# Patient Record
Sex: MALE | Race: WHITE | NOT HISPANIC OR LATINO | Employment: PART TIME | URBAN - METROPOLITAN AREA
[De-identification: names, ages, dates, MRNs, and addresses within clinical notes are randomized per-mention and may not be internally consistent; named-entity substitution may affect disease eponyms.]

---

## 2017-06-13 ENCOUNTER — OFFICE VISIT (OUTPATIENT)
Dept: FAMILY MEDICINE | Facility: CLINIC | Age: 32
End: 2017-06-13
Attending: FAMILY MEDICINE
Payer: COMMERCIAL

## 2017-06-13 ENCOUNTER — LAB VISIT (OUTPATIENT)
Dept: LAB | Facility: HOSPITAL | Age: 32
End: 2017-06-13
Attending: FAMILY MEDICINE
Payer: COMMERCIAL

## 2017-06-13 VITALS
SYSTOLIC BLOOD PRESSURE: 132 MMHG | RESPIRATION RATE: 16 BRPM | HEIGHT: 74 IN | DIASTOLIC BLOOD PRESSURE: 78 MMHG | OXYGEN SATURATION: 99 % | BODY MASS INDEX: 26.68 KG/M2 | HEART RATE: 58 BPM | WEIGHT: 207.88 LBS

## 2017-06-13 DIAGNOSIS — Z00.00 ANNUAL PHYSICAL EXAM: ICD-10-CM

## 2017-06-13 DIAGNOSIS — Z00.00 ANNUAL PHYSICAL EXAM: Primary | ICD-10-CM

## 2017-06-13 LAB
ALBUMIN SERPL BCP-MCNC: 4.1 G/DL
ALP SERPL-CCNC: 78 U/L
ALT SERPL W/O P-5'-P-CCNC: 16 U/L
ANION GAP SERPL CALC-SCNC: 9 MMOL/L
AST SERPL-CCNC: 17 U/L
BASOPHILS # BLD AUTO: 0.01 K/UL
BASOPHILS NFR BLD: 0.2 %
BILIRUB SERPL-MCNC: 0.5 MG/DL
BILIRUB SERPL-MCNC: NEGATIVE MG/DL
BLOOD URINE, POC: NEGATIVE
BUN SERPL-MCNC: 15 MG/DL
CALCIUM SERPL-MCNC: 9.6 MG/DL
CHLORIDE SERPL-SCNC: 102 MMOL/L
CO2 SERPL-SCNC: 25 MMOL/L
COLOR, POC UA: YELLOW
CREAT SERPL-MCNC: 1.1 MG/DL
DIFFERENTIAL METHOD: NORMAL
EOSINOPHIL # BLD AUTO: 0.1 K/UL
EOSINOPHIL NFR BLD: 1.5 %
ERYTHROCYTE [DISTWIDTH] IN BLOOD BY AUTOMATED COUNT: 12.3 %
EST. GFR  (AFRICAN AMERICAN): >60 ML/MIN/1.73 M^2
EST. GFR  (NON AFRICAN AMERICAN): >60 ML/MIN/1.73 M^2
GLUCOSE SERPL-MCNC: 93 MG/DL
GLUCOSE UR QL STRIP: NORMAL
HCT VFR BLD AUTO: 43 %
HGB BLD-MCNC: 14.4 G/DL
KETONES UR QL STRIP: NEGATIVE
LEUKOCYTE ESTERASE URINE, POC: NEGATIVE
LYMPHOCYTES # BLD AUTO: 2.1 K/UL
LYMPHOCYTES NFR BLD: 39.6 %
MCH RBC QN AUTO: 30.3 PG
MCHC RBC AUTO-ENTMCNC: 33.5 %
MCV RBC AUTO: 90 FL
MONOCYTES # BLD AUTO: 0.4 K/UL
MONOCYTES NFR BLD: 8.1 %
NEUTROPHILS # BLD AUTO: 2.7 K/UL
NEUTROPHILS NFR BLD: 50.4 %
NITRITE, POC UA: NEGATIVE
PH, POC UA: 6
PLATELET # BLD AUTO: 245 K/UL
PMV BLD AUTO: 11.5 FL
POTASSIUM SERPL-SCNC: 4.4 MMOL/L
PROT SERPL-MCNC: 7.4 G/DL
PROTEIN, POC: ABNORMAL
RBC # BLD AUTO: 4.76 M/UL
SODIUM SERPL-SCNC: 136 MMOL/L
SPECIFIC GRAVITY, POC UA: 1.01
TSH SERPL DL<=0.005 MIU/L-ACNC: 1.62 UIU/ML
UROBILINOGEN, POC UA: NORMAL
WBC # BLD AUTO: 5.41 K/UL

## 2017-06-13 PROCEDURE — 99999 PR PBB SHADOW E&M-NEW PATIENT-LVL III: CPT | Mod: PBBFAC,,, | Performed by: FAMILY MEDICINE

## 2017-06-13 PROCEDURE — 99385 PREV VISIT NEW AGE 18-39: CPT | Mod: S$GLB,,, | Performed by: FAMILY MEDICINE

## 2017-06-13 PROCEDURE — 85025 COMPLETE CBC W/AUTO DIFF WBC: CPT

## 2017-06-13 PROCEDURE — 81001 URINALYSIS AUTO W/SCOPE: CPT | Mod: S$GLB,,, | Performed by: FAMILY MEDICINE

## 2017-06-13 PROCEDURE — 84443 ASSAY THYROID STIM HORMONE: CPT

## 2017-06-13 PROCEDURE — 80053 COMPREHEN METABOLIC PANEL: CPT

## 2017-06-13 PROCEDURE — 36415 COLL VENOUS BLD VENIPUNCTURE: CPT | Mod: PO

## 2017-06-13 NOTE — PATIENT INSTRUCTIONS
Your test results will be communicated to you via : [facility], Telephone or Letter.   If you have not received your test results in one week, please contact the clinic at [phone].

## 2017-06-15 NOTE — PROGRESS NOTES
Subjective:       Patient ID: Yg Oh is a 31 y.o. male.    Chief Complaint: Annual Exam    HPI   Pt is here for annual exam pt is well no sob/cp no complaints  Review of Systems   Constitutional: Negative for activity change, chills, fatigue and fever.   HENT: Negative for congestion, ear pain, hearing loss, postnasal drip, rhinorrhea, sinus pressure and sore throat.    Eyes: Negative for photophobia, pain, redness and visual disturbance.   Respiratory: Negative for cough, chest tightness and shortness of breath.    Cardiovascular: Negative for chest pain, palpitations and leg swelling.   Gastrointestinal: Negative for abdominal pain, blood in stool, constipation, diarrhea, nausea and vomiting.   Genitourinary: Negative for decreased urine volume, difficulty urinating, discharge, dysuria, frequency and urgency.   Musculoskeletal: Negative for back pain, joint swelling and neck pain.   Skin: Negative for color change, pallor and rash.   Neurological: Negative for dizziness, seizures, speech difficulty and numbness.   Hematological: Does not bruise/bleed easily.   Psychiatric/Behavioral: Negative for behavioral problems, confusion, decreased concentration and suicidal ideas.       Objective:      Physical Exam   Constitutional: He is oriented to person, place, and time. He appears well-developed and well-nourished. No distress.   HENT:   Head: Normocephalic and atraumatic.   Nose: Nose normal.   Mouth/Throat: Oropharynx is clear and moist.   Eyes: EOM are normal. Pupils are equal, round, and reactive to light.   Neck: Normal range of motion. Neck supple. No thyromegaly present.   Cardiovascular: Normal rate, regular rhythm, normal heart sounds and intact distal pulses.  Exam reveals no gallop and no friction rub.    No murmur heard.  Pulmonary/Chest: Effort normal and breath sounds normal. No respiratory distress.   Abdominal: Soft. Bowel sounds are normal. He exhibits no distension. There is no tenderness. There  "is no rebound and no guarding.   Genitourinary:   Genitourinary Comments: declined   Musculoskeletal: Normal range of motion. He exhibits no edema or tenderness.   Neurological: He is alert and oriented to person, place, and time. No cranial nerve deficit. Coordination normal.   Skin: Skin is warm and dry. No erythema.   Psychiatric: He has a normal mood and affect. His behavior is normal. Judgment and thought content normal.       Assessment:       1. Annual physical exam        Plan:     orders cbc cmp lipid tsh   Cont meds  Low fat low salt diet  Graded exercise  rtc annually and prn     Health maintenance  Lipid ordered  Flu shot in fall  Tetanus q 10 years         "This note will not be shared with the patient."   "

## 2017-06-16 ENCOUNTER — TELEPHONE (OUTPATIENT)
Dept: FAMILY MEDICINE | Facility: CLINIC | Age: 32
End: 2017-06-16

## 2018-08-13 ENCOUNTER — LAB VISIT (OUTPATIENT)
Dept: LAB | Facility: HOSPITAL | Age: 33
End: 2018-08-13
Attending: FAMILY MEDICINE
Payer: COMMERCIAL

## 2018-08-13 ENCOUNTER — OFFICE VISIT (OUTPATIENT)
Dept: FAMILY MEDICINE | Facility: CLINIC | Age: 33
End: 2018-08-13
Attending: FAMILY MEDICINE
Payer: COMMERCIAL

## 2018-08-13 VITALS
OXYGEN SATURATION: 98 % | DIASTOLIC BLOOD PRESSURE: 70 MMHG | BODY MASS INDEX: 27.38 KG/M2 | HEIGHT: 74 IN | SYSTOLIC BLOOD PRESSURE: 102 MMHG | WEIGHT: 213.31 LBS | HEART RATE: 63 BPM

## 2018-08-13 DIAGNOSIS — H61.22 CERUMEN DEBRIS ON TYMPANIC MEMBRANE OF LEFT EAR: ICD-10-CM

## 2018-08-13 DIAGNOSIS — Z00.00 ANNUAL PHYSICAL EXAM: ICD-10-CM

## 2018-08-13 DIAGNOSIS — Z00.00 ANNUAL PHYSICAL EXAM: Primary | ICD-10-CM

## 2018-08-13 LAB
ALBUMIN SERPL BCP-MCNC: 4.2 G/DL
ALP SERPL-CCNC: 75 U/L
ALT SERPL W/O P-5'-P-CCNC: 21 U/L
ANION GAP SERPL CALC-SCNC: 7 MMOL/L
AST SERPL-CCNC: 19 U/L
BASOPHILS # BLD AUTO: 0.02 K/UL
BASOPHILS NFR BLD: 0.4 %
BILIRUB SERPL-MCNC: 0.6 MG/DL
BILIRUB SERPL-MCNC: NEGATIVE MG/DL
BLOOD URINE, POC: NEGATIVE
BUN SERPL-MCNC: 14 MG/DL
CALCIUM SERPL-MCNC: 9.7 MG/DL
CHLORIDE SERPL-SCNC: 104 MMOL/L
CHOLEST SERPL-MCNC: 201 MG/DL
CHOLEST/HDLC SERPL: 4 {RATIO}
CO2 SERPL-SCNC: 27 MMOL/L
COLOR, POC UA: YELLOW
CREAT SERPL-MCNC: 1 MG/DL
DIFFERENTIAL METHOD: ABNORMAL
EOSINOPHIL # BLD AUTO: 0.1 K/UL
EOSINOPHIL NFR BLD: 1.8 %
ERYTHROCYTE [DISTWIDTH] IN BLOOD BY AUTOMATED COUNT: 12.7 %
EST. GFR  (AFRICAN AMERICAN): >60 ML/MIN/1.73 M^2
EST. GFR  (NON AFRICAN AMERICAN): >60 ML/MIN/1.73 M^2
GLUCOSE SERPL-MCNC: 95 MG/DL
GLUCOSE UR QL STRIP: NORMAL
HCT VFR BLD AUTO: 47.1 %
HDLC SERPL-MCNC: 50 MG/DL
HDLC SERPL: 24.9 %
HGB BLD-MCNC: 14.8 G/DL
IMM GRANULOCYTES # BLD AUTO: 0.01 K/UL
IMM GRANULOCYTES NFR BLD AUTO: 0.2 %
KETONES UR QL STRIP: NEGATIVE
LDLC SERPL CALC-MCNC: 134.4 MG/DL
LEUKOCYTE ESTERASE URINE, POC: NEGATIVE
LYMPHOCYTES # BLD AUTO: 2.1 K/UL
LYMPHOCYTES NFR BLD: 42.1 %
MCH RBC QN AUTO: 29.2 PG
MCHC RBC AUTO-ENTMCNC: 31.4 G/DL
MCV RBC AUTO: 93 FL
MONOCYTES # BLD AUTO: 0.3 K/UL
MONOCYTES NFR BLD: 6.6 %
NEUTROPHILS # BLD AUTO: 2.4 K/UL
NEUTROPHILS NFR BLD: 48.9 %
NITRITE, POC UA: NEGATIVE
NONHDLC SERPL-MCNC: 151 MG/DL
NRBC BLD-RTO: 0 /100 WBC
PH, POC UA: 6
PLATELET # BLD AUTO: 235 K/UL
PMV BLD AUTO: 11.4 FL
POTASSIUM SERPL-SCNC: 4.4 MMOL/L
PROT SERPL-MCNC: 7.4 G/DL
PROTEIN, POC: NEGATIVE
RBC # BLD AUTO: 5.07 M/UL
SODIUM SERPL-SCNC: 138 MMOL/L
SPECIFIC GRAVITY, POC UA: 1.01
TRIGL SERPL-MCNC: 83 MG/DL
TSH SERPL DL<=0.005 MIU/L-ACNC: 1.41 UIU/ML
UROBILINOGEN, POC UA: NORMAL
WBC # BLD AUTO: 4.87 K/UL

## 2018-08-13 PROCEDURE — 99999 PR PBB SHADOW E&M-EST. PATIENT-LVL III: CPT | Mod: PBBFAC,,, | Performed by: FAMILY MEDICINE

## 2018-08-13 PROCEDURE — 99395 PREV VISIT EST AGE 18-39: CPT | Mod: 25,S$GLB,, | Performed by: FAMILY MEDICINE

## 2018-08-13 PROCEDURE — 80061 LIPID PANEL: CPT

## 2018-08-13 PROCEDURE — 84443 ASSAY THYROID STIM HORMONE: CPT

## 2018-08-13 PROCEDURE — 80053 COMPREHEN METABOLIC PANEL: CPT

## 2018-08-13 PROCEDURE — 85025 COMPLETE CBC W/AUTO DIFF WBC: CPT

## 2018-08-13 PROCEDURE — 81001 URINALYSIS AUTO W/SCOPE: CPT | Mod: S$GLB,,, | Performed by: FAMILY MEDICINE

## 2018-08-13 PROCEDURE — 36415 COLL VENOUS BLD VENIPUNCTURE: CPT | Mod: PO

## 2018-08-13 NOTE — PROGRESS NOTES
Subjective:       Patient ID: Yg Oh is a 32 y.o. male.    Chief Complaint: Annual Exam    HPI   Pt is here for annual exam pt is well no sob/cp no change in bowel habits no brbpr  Review of Systems   Constitutional: Negative for activity change, chills, fatigue and fever.   HENT: Negative for congestion, ear pain, hearing loss, postnasal drip, rhinorrhea, sinus pressure and sore throat.    Eyes: Negative for photophobia, pain, redness and visual disturbance.   Respiratory: Negative for cough, chest tightness and shortness of breath.    Cardiovascular: Negative for chest pain, palpitations and leg swelling.   Gastrointestinal: Negative for abdominal pain, blood in stool, constipation, diarrhea, nausea and vomiting.   Genitourinary: Negative for decreased urine volume, difficulty urinating, discharge, dysuria, frequency and urgency.   Musculoskeletal: Negative for back pain, joint swelling and neck pain.   Skin: Negative for color change, pallor and rash.   Neurological: Negative for dizziness, seizures, speech difficulty and numbness.   Hematological: Does not bruise/bleed easily.   Psychiatric/Behavioral: Negative for behavioral problems, confusion, decreased concentration and suicidal ideas.       Objective:      Physical Exam   Constitutional: He is oriented to person, place, and time. He appears well-developed and well-nourished. No distress.   HENT:   Head: Normocephalic and atraumatic.   Nose: Nose normal.   Mouth/Throat: Oropharynx is clear and moist.   Tm pearly right obscured left   Eyes: EOM are normal. Pupils are equal, round, and reactive to light.   Neck: Normal range of motion. Neck supple. No thyromegaly present.   Cardiovascular: Normal rate, regular rhythm and intact distal pulses. Exam reveals no gallop and no friction rub.   Pulmonary/Chest: Effort normal and breath sounds normal. No respiratory distress.   Abdominal: Soft. Bowel sounds are normal. He exhibits no distension. There is no  "tenderness. There is no rebound and no guarding.   Genitourinary:   Genitourinary Comments: declined   Musculoskeletal: Normal range of motion. He exhibits no edema or tenderness.   Neurological: He is alert and oriented to person, place, and time. No cranial nerve deficit. Coordination normal.   Skin: Skin is warm and dry. No erythema.   Psychiatric: He has a normal mood and affect. His behavior is normal. Judgment and thought content normal.       Assessment:       1. Annual physical exam    2. Cerumen debris on tympanic membrane of left ear        Plan:     orders cmp cbc lipid tsh urine  Cont meds   low fat diet  Graded exercise  rtc annually     Health maintenance  Lipid ordered  Flu in fall  Tetanus q 10 years      "This note will not be shared with the patient."   "

## 2018-08-14 ENCOUNTER — TELEPHONE (OUTPATIENT)
Dept: FAMILY MEDICINE | Facility: CLINIC | Age: 33
End: 2018-08-14

## 2019-02-12 ENCOUNTER — CLINICAL SUPPORT (OUTPATIENT)
Dept: AUDIOLOGY | Facility: CLINIC | Age: 34
End: 2019-02-12
Payer: COMMERCIAL

## 2019-02-12 ENCOUNTER — OFFICE VISIT (OUTPATIENT)
Dept: OTOLARYNGOLOGY | Facility: CLINIC | Age: 34
End: 2019-02-12
Payer: COMMERCIAL

## 2019-02-12 VITALS
DIASTOLIC BLOOD PRESSURE: 67 MMHG | BODY MASS INDEX: 28.5 KG/M2 | WEIGHT: 222 LBS | TEMPERATURE: 97 F | HEART RATE: 69 BPM | SYSTOLIC BLOOD PRESSURE: 103 MMHG

## 2019-02-12 DIAGNOSIS — H61.23 HEARING LOSS DUE TO CERUMEN IMPACTION, BILATERAL: Primary | ICD-10-CM

## 2019-02-12 DIAGNOSIS — Z86.69 HISTORY OF EAR INFECTION: ICD-10-CM

## 2019-02-12 DIAGNOSIS — H92.02 EAR DISCOMFORT, LEFT: Primary | ICD-10-CM

## 2019-02-12 DIAGNOSIS — H61.22 HEARING LOSS DUE TO CERUMEN IMPACTION, LEFT: ICD-10-CM

## 2019-02-12 PROCEDURE — 99999 PR PBB SHADOW E&M-EST. PATIENT-LVL I: CPT | Mod: PBBFAC,,,

## 2019-02-12 PROCEDURE — 92567 PR TYMPA2METRY: ICD-10-PCS | Mod: S$GLB,,, | Performed by: AUDIOLOGIST-HEARING AID FITTER

## 2019-02-12 PROCEDURE — 99999 PR PBB SHADOW E&M-EST. PATIENT-LVL III: ICD-10-PCS | Mod: PBBFAC,,, | Performed by: OTOLARYNGOLOGY

## 2019-02-12 PROCEDURE — 99203 OFFICE O/P NEW LOW 30 MIN: CPT | Mod: 25,S$GLB,, | Performed by: OTOLARYNGOLOGY

## 2019-02-12 PROCEDURE — 69210 REMOVE IMPACTED EAR WAX UNI: CPT | Mod: S$GLB,,, | Performed by: OTOLARYNGOLOGY

## 2019-02-12 PROCEDURE — 92557 COMPREHENSIVE HEARING TEST: CPT | Mod: S$GLB,,, | Performed by: AUDIOLOGIST-HEARING AID FITTER

## 2019-02-12 PROCEDURE — 99999 PR PBB SHADOW E&M-EST. PATIENT-LVL III: CPT | Mod: PBBFAC,,, | Performed by: OTOLARYNGOLOGY

## 2019-02-12 PROCEDURE — 3008F BODY MASS INDEX DOCD: CPT | Mod: CPTII,S$GLB,, | Performed by: OTOLARYNGOLOGY

## 2019-02-12 PROCEDURE — 99203 PR OFFICE/OUTPT VISIT, NEW, LEVL III, 30-44 MIN: ICD-10-PCS | Mod: 25,S$GLB,, | Performed by: OTOLARYNGOLOGY

## 2019-02-12 PROCEDURE — 99999 PR PBB SHADOW E&M-EST. PATIENT-LVL I: ICD-10-PCS | Mod: PBBFAC,,,

## 2019-02-12 PROCEDURE — 92567 TYMPANOMETRY: CPT | Mod: S$GLB,,, | Performed by: AUDIOLOGIST-HEARING AID FITTER

## 2019-02-12 PROCEDURE — 69210 PR REMOVAL IMPACTED CERUMEN REQUIRING INSTRUMENTATION, UNILATERAL: ICD-10-PCS | Mod: S$GLB,,, | Performed by: OTOLARYNGOLOGY

## 2019-02-12 PROCEDURE — 92557 PR COMPREHENSIVE HEARING TEST: ICD-10-PCS | Mod: S$GLB,,, | Performed by: AUDIOLOGIST-HEARING AID FITTER

## 2019-02-12 PROCEDURE — 3008F PR BODY MASS INDEX (BMI) DOCUMENTED: ICD-10-PCS | Mod: CPTII,S$GLB,, | Performed by: OTOLARYNGOLOGY

## 2019-02-12 NOTE — PATIENT INSTRUCTIONS
Large occlusive C.I. extracted from AS eac  Audiometry reviewed: hearing WNL  Hearing protection encouraged prn  RTC prn ear cleaning

## 2019-02-12 NOTE — PROGRESS NOTES
Subjective:       Patient ID: Yg Oh is a 33 y.o. male.    Chief Complaint: Cerumen Impaction    HPI; Mr. Oh is a 33-year-old gentleman originally from near [facility] who works for the [employer].  He works as a  and .   He indicates trouble hearing and ear ringing perception in his left ear. His hand written reason for the visit today is ear flush  He indicates the feeling of something blocking his left ear.  He denies any history of ear pain, ear pressure, ear discharge, dizziness, exposure to ototoxic drugs or a family history of hearing.  He indicates trouble with the ear infection treated with antibiotics at an [facility] after his plane landed a few years ago.    PMH:  PSH:  Family history:  Mental illness, breast cancer  Allergies:  None known  Habits:  2 alcoholic drinks per day and 1 cup of coffee per day  Occupation:    Review of Systems   Ears: Positive for hearing loss and ringing in ear.    Nose:  Positive for nosebleeds, nasal obstruction and postnasal drip.    Mouth/Throat: Positive for oral ulcers.   Gastrointestinal:  Positive for acid reflux and blood in stool.   Other:  Negative for rash.         The patient completed an audiometric study performed by the [facility] audiology service after his left ear was cleaned.  The study is duplicated below and the results are reviewed with him in detail  Objective:         Blood pressure 103/67 pulse 69 temperature 97.2° height 6 ft 2 in weight 222 lb  General:  Alert and oriented, bearded, mustachioed Aussie gentleman in no acute distress  Both ears were examined under the microscope in the micro procedure room  Procedure:  A large occlusive cerumen impaction is carefully extracted from left ear canal with an angle pick  Physical Exam   Constitutional: He is oriented to person, place, and time. He appears well-developed and well-nourished.   HENT:   Head: Normocephalic.   Right Ear: Hearing, tympanic  membrane and ear canal normal. No drainage. No foreign bodies. No mastoid tenderness. Tympanic membrane is not perforated. No decreased hearing is noted.   Left Ear: Hearing, tympanic membrane and ear canal normal. No drainage. No foreign bodies. No mastoid tenderness. Tympanic membrane is not perforated. No decreased hearing is noted.   Ears:    Nose: No nose lacerations, nasal deformity, septal deviation or nasal septal hematoma. No epistaxis. Right sinus exhibits no maxillary sinus tenderness and no frontal sinus tenderness. Left sinus exhibits no maxillary sinus tenderness and no frontal sinus tenderness.   Mouth/Throat: Uvula is midline, oropharynx is clear and moist and mucous membranes are normal. He does not have dentures. No oral lesions. No trismus in the jaw. No uvula swelling or dental caries. No oropharyngeal exudate or tonsillar abscesses.   Neck: No thyromegaly present.   Pulmonary/Chest: Effort normal. No stridor.   Lymphadenopathy:     He has no cervical adenopathy.   Neurological: He is alert and oriented to person, place, and time.   Skin: No rash noted.   Psychiatric: His behavior is normal.       Assessment:       1. Ear discomfort, left    2. Hearing loss due to cerumen impaction, left    3. History of ear infection        Plan:     Large occlusive C.I. extracted from AS eac  Audiometry reviewed: hearing WNL  Hearing protection encouraged prn  RTC prn ear cleaning

## 2019-02-12 NOTE — LETTER
February 13, 2019      Shanna Rodrigues MD  [address]  [address]  [facility] [address]           [facility] - Otorhinolaryngology  [address]  [facility] [address]  Phone: [phone]  Fax: [phone]          Patient: Yg Oh   MR Number: [id number]   YOB: 1985   Date of Visit: 2/12/2019       Dear Dr. Shanna Rodrigues:    Thank you for referring Yg Oh to me for evaluation. Attached you will find relevant portions of my assessment and plan of care.    If you have questions, please do not hesitate to call me. I look forward to following Yg Oh along with you.    Sincerely,    Matt Sims III, MD    Enclosure  CC:  No Recipients    If you would like to receive this communication electronically, please contact [email or web address] or [phone] to request more information on [facility] access.    For providers and/or their staff who would like to refer a patient to [facility], please contact us through our one-stop-shop provider referral line, [facility], at [phone].    If you feel you have received this communication in error or would no longer like to receive these types of communications, please e-mail [email or web address]

## 2019-02-12 NOTE — PROGRESS NOTES
Yg Oh was seen in the clinic today for a hearing evaluation following cerumen removal by Dr. Sims. Mr. Oh reported occasional noise exposure.       Audiological testing revealed hearing within normal limits in the left ear and normal to borderline normal hearing in the right ear. A speech reception threshold was obtained at 5 dBHL, bilaterally. Speech recognition was 100% in both ears.    Tympanometry revealed normal Type A tympanograms, bilaterally.    Recommendations:  1. Otologic evaluation  2. Annual hearing evaluation

## 2019-04-30 ENCOUNTER — OFFICE VISIT (OUTPATIENT)
Dept: URGENT CARE | Facility: CLINIC | Age: 34
End: 2019-04-30
Payer: COMMERCIAL

## 2019-04-30 VITALS
WEIGHT: 222 LBS | TEMPERATURE: 98 F | BODY MASS INDEX: 28.49 KG/M2 | RESPIRATION RATE: 16 BRPM | OXYGEN SATURATION: 99 % | HEIGHT: 74 IN | HEART RATE: 72 BPM | SYSTOLIC BLOOD PRESSURE: 122 MMHG | DIASTOLIC BLOOD PRESSURE: 72 MMHG

## 2019-04-30 DIAGNOSIS — M26.609 TMJ (TEMPOROMANDIBULAR JOINT SYNDROME): ICD-10-CM

## 2019-04-30 DIAGNOSIS — J01.00 ACUTE MAXILLARY SINUSITIS, RECURRENCE NOT SPECIFIED: Primary | ICD-10-CM

## 2019-04-30 PROCEDURE — 99213 OFFICE O/P EST LOW 20 MIN: CPT | Mod: S$GLB,,, | Performed by: NURSE PRACTITIONER

## 2019-04-30 PROCEDURE — 99213 PR OFFICE/OUTPT VISIT, EST, LEVL III, 20-29 MIN: ICD-10-PCS | Mod: S$GLB,,, | Performed by: NURSE PRACTITIONER

## 2019-04-30 PROCEDURE — 3008F BODY MASS INDEX DOCD: CPT | Mod: CPTII,S$GLB,, | Performed by: NURSE PRACTITIONER

## 2019-04-30 PROCEDURE — 3008F PR BODY MASS INDEX (BMI) DOCUMENTED: ICD-10-PCS | Mod: CPTII,S$GLB,, | Performed by: NURSE PRACTITIONER

## 2019-04-30 RX ORDER — IBUPROFEN 800 MG/1
800 TABLET ORAL 3 TIMES DAILY
Qty: 30 TABLET | Refills: 0 | Status: SHIPPED | OUTPATIENT
Start: 2019-04-30

## 2019-04-30 RX ORDER — FLUTICASONE PROPIONATE 50 MCG
2 SPRAY, SUSPENSION (ML) NASAL DAILY
Qty: 1 BOTTLE | Refills: 0 | Status: SHIPPED | OUTPATIENT
Start: 2019-04-30 | End: 2019-07-27 | Stop reason: ALTCHOICE

## 2019-04-30 RX ORDER — PREDNISONE 20 MG/1
20 TABLET ORAL DAILY
Qty: 4 TABLET | Refills: 0 | Status: SHIPPED | OUTPATIENT
Start: 2019-04-30 | End: 2019-05-04

## 2019-04-30 NOTE — PROGRESS NOTES
"Subjective:       Patient ID: Yg Oh is a 33 y.o. male.    Vitals:    04/30/19 1256   BP: 122/72   Pulse: 72   Resp: 16   Temp: 98.1 °F (36.7 °C)   SpO2: 99%   Weight: 100.7 kg (222 lb)   Height: 6' 2" (1.88 m)       Chief Complaint: Sinus Problem    Pt states sinus congestion and pressure off and on x 1 month. C/o left ear and jaw pain. Pain worsening with eating.     Sinus Problem   This is a recurrent problem. The current episode started more than 1 month ago. The problem is unchanged. There has been no fever. Associated symptoms include congestion, ear pain, sinus pressure and sneezing. Pertinent negatives include no chills, headaches, shortness of breath or sore throat. Treatments tried: sudafed, ibuprofen. The treatment provided mild relief.     Review of Systems   Constitution: Negative for chills and fever.   HENT: Positive for congestion, ear pain, sinus pressure and sneezing. Negative for sore throat.    Eyes: Negative for blurred vision.   Cardiovascular: Negative for chest pain.   Respiratory: Negative for shortness of breath.    Skin: Negative for rash.   Musculoskeletal: Negative for back pain and joint pain.   Gastrointestinal: Negative for abdominal pain, diarrhea, nausea and vomiting.   Neurological: Negative for headaches.   Psychiatric/Behavioral: The patient is not nervous/anxious.        Objective:      Physical Exam   Constitutional: He is oriented to person, place, and time. He appears well-developed and well-nourished. He is cooperative.  Non-toxic appearance. He does not appear ill. No distress.   HENT:   Head: Normocephalic and atraumatic.       Right Ear: Hearing, tympanic membrane, external ear and ear canal normal.   Left Ear: Hearing, tympanic membrane, external ear and ear canal normal.   Nose: Nose normal. No mucosal edema, rhinorrhea or nasal deformity. No epistaxis. Right sinus exhibits no maxillary sinus tenderness and no frontal sinus tenderness. Left sinus exhibits no " maxillary sinus tenderness and no frontal sinus tenderness.   Mouth/Throat: Uvula is midline, oropharynx is clear and moist and mucous membranes are normal. No trismus in the jaw. Normal dentition. No uvula swelling. No posterior oropharyngeal erythema.   Eyes: Conjunctivae and lids are normal. Right eye exhibits no discharge. Left eye exhibits no discharge. No scleral icterus.   Sclera clear bilat   Neck: Trachea normal, normal range of motion, full passive range of motion without pain and phonation normal. Neck supple.   Cardiovascular: Normal rate, regular rhythm, normal heart sounds, intact distal pulses and normal pulses.   Pulmonary/Chest: Effort normal and breath sounds normal. No respiratory distress.   Abdominal: Soft. Normal appearance and bowel sounds are normal. He exhibits no distension, no pulsatile midline mass and no mass. There is no tenderness.   Musculoskeletal: Normal range of motion. He exhibits no edema or deformity.   Neurological: He is alert and oriented to person, place, and time. He exhibits normal muscle tone. Coordination normal.   Skin: Skin is warm, dry and intact. He is not diaphoretic. No pallor.   Psychiatric: He has a normal mood and affect. His speech is normal and behavior is normal. Judgment and thought content normal. Cognition and memory are normal.   Nursing note and vitals reviewed.      Assessment:       1. Acute maxillary sinusitis, recurrence not specified    2. TMJ (temporomandibular joint syndrome)        Plan:       Yg was seen today for sinus problem.    Diagnoses and all orders for this visit:    Acute maxillary sinusitis, recurrence not specified  -     fluticasone propionate (FLONASE) 50 mcg/actuation nasal spray; 2 sprays (100 mcg total) by Each Nare route once daily.  -     predniSONE (DELTASONE) 20 MG tablet; Take 1 tablet (20 mg total) by mouth once daily. for 4 days    TMJ (temporomandibular joint syndrome)  -     ibuprofen (ADVIL,MOTRIN) 800 MG tablet; Take  1 tablet (800 mg total) by mouth 3 (three) times daily.  -     predniSONE (DELTASONE) 20 MG tablet; Take 1 tablet (20 mg total) by mouth once daily. for 4 days    Please follow up with your Primary care provider within 2-5 days if your signs and symptoms have not resolved or worsen.     If your condition worsens or fails to improve we recommend that you receive another evaluation at the emergency room immediately or contact your primary medical clinic to discuss your concerns.   You must understand that you have received an Urgent Care treatment only and that you may be released before all of your medical problems are known or treated. You, the patient, will arrange for follow up care as instructed.     RED FLAGS/WARNING SYMPTOMS DISCUSSED WITH PATIENT THAT WOULD WARRANT EMERGENT MEDICAL ATTENTION. PATIENT VERBALIZED UNDERSTANDING.       TMJ Syndrome  The temporomandibular joint (TMJ) is the joint that connects your lower jaw to your head. You can feel it in front of your ears when you open and close your mouth. TMJ disorders involve chronic or recurrent pain in the joint. When treated, symptoms of TMJ disorders usually go away within a few months.  Causes  There is no widely agreed-on cause of TMJ disorders. They have been linked to injury, arthritis, chronic fatigue syndrome, and fibromyalgia. A definite connection has not been shown, though.  Symptoms  · Pain in the face, jaw, or neck  · Pain with jaw movement or chewing  · Locking or catching sensation of the jaw  · Clicking, popping, or grinding sounds with movement of the TMJ  · Headache  · Ear pain  Home care  Modest, nonsurgical treatments are a good first step toward relieving symptoms. Try the approaches described below.  · Rest the jaw by avoiding crunchy or hard-to-chew foods. Do not eat hard or sticky candies. Soft foods and liquids are easier on the jaw.  · Protect your jaw while yawning. If you need to yawn, put your fist under your chin to prevent  your mouth from opening up too wide.  · To help relieve pain, try applying hot or cold packs to the painful area. Try both hot and cold to find out which works best for you. If you use hot packs (small towels soaked in hot water), be careful not to burn yourself.  · You may take acetaminophen or ibuprofen for pain, unless you were given a different pain medicine. (Note: If you have chronic liver or kidney disease or have ever had a stomach ulcer or gastrointestinal bleeding, talk with your healthcare provider before using these medicines. Also talk to your provider if you are taking medicine to prevent blood clots.) Aspirin should never be given to anyone younger than 18 years of age who is ill with a viral infection or fever. It may cause severe liver or brain damage.  Reducing stress  If stress seems to be contributing to your symptoms, try to identify the sources of stress in your life. These arent always obvious. Common stressors include:  · Everyday hassles (which can add up), such as traffic jams, missed appointments, or car trouble  · Major life changes, both good (such as a new baby or job promotion) and bad (loss of job or loss of a loved one)  · Overload: The feeling that you have too many responsibilities and can't take care of everything at once  · Helplessness: Feeling like your problems are more than you can solve  When possible, do something about your sources of stress. See if you can avoid hassles, limit the amount of change in your life at one time, and take breaks when you feel overloaded.  Unfortunately, many stressful situations cannot be avoided. So learning how to manage stress better is very important. Getting regular exercise, eating nutritious, balanced meals, and getting adequate rest all help to make everyday stress more manageable. Certain techniques are also helpful: relaxation and breathing exercises, visualization, biofeedback, meditation, or simply taking some time out to clear your  mind. For more information, talk with your healthcare provider.  Follow-up care  Follow up with your healthcare provider, or as advised. Further testing and additional treatment may be required. If changes to your lifestyle do not improve your symptoms, talk with your healthcare provider about other available therapies. These include bite guards for help with teeth grinding, stress management techniques, and more. If stress is an important factor and does not respond to the above simple measures, talk to your doctor about a referral for stress management.  · If X-rays were done, they will be reviewed by a specialist. You will be notified of the results, especially if they affect treatment.  Call 911  Call emergency services right away if any of these occur:  · Trouble breathing or swallowing, wheezing  · Confusion  · Extreme drowsiness or trouble awakening  · Fainting or loss of consciousness  · Rapid heart rate  When to seek medical advice  Call your healthcare provider right away if any of these occur:  · Your face becomes swollen or red.  · Your pain worsens.  · You have increasing neck, mouth, tooth, or throat pain.  · You develop a fever of 100.4F (38°C) or higher  Date Last Reviewed: 7/30/2015  © 7926-8648 [employer]. [address] [place] [address], [place] [address]. All rights reserved. This information is not intended as a substitute for professional medical care. Always follow your healthcare professional's instructions.

## 2019-04-30 NOTE — PATIENT INSTRUCTIONS
Please follow up with your Primary care provider within 2-5 days if your signs and symptoms have not resolved or worsen.     If your condition worsens or fails to improve we recommend that you receive another evaluation at the emergency room immediately or contact your primary medical clinic to discuss your concerns.   You must understand that you have received an Urgent Care treatment only and that you may be released before all of your medical problems are known or treated. You, the patient, will arrange for follow up care as instructed.     RED FLAGS/WARNING SYMPTOMS DISCUSSED WITH PATIENT THAT WOULD WARRANT EMERGENT MEDICAL ATTENTION. PATIENT VERBALIZED UNDERSTANDING.       TMJ Syndrome  The temporomandibular joint (TMJ) is the joint that connects your lower jaw to your head. You can feel it in front of your ears when you open and close your mouth. TMJ disorders involve chronic or recurrent pain in the joint. When treated, symptoms of TMJ disorders usually go away within a few months.  Causes  There is no widely agreed-on cause of TMJ disorders. They have been linked to injury, arthritis, chronic fatigue syndrome, and fibromyalgia. A definite connection has not been shown, though.  Symptoms  · Pain in the face, jaw, or neck  · Pain with jaw movement or chewing  · Locking or catching sensation of the jaw  · Clicking, popping, or grinding sounds with movement of the TMJ  · Headache  · Ear pain  Home care  Modest, nonsurgical treatments are a good first step toward relieving symptoms. Try the approaches described below.  · Rest the jaw by avoiding crunchy or hard-to-chew foods. Do not eat hard or sticky candies. Soft foods and liquids are easier on the jaw.  · Protect your jaw while yawning. If you need to yawn, put your fist under your chin to prevent your mouth from opening up too wide.  · To help relieve pain, try applying hot or cold packs to the painful area. Try both hot and cold to find out which works best  for you. If you use hot packs (small towels soaked in hot water), be careful not to burn yourself.  · You may take acetaminophen or ibuprofen for pain, unless you were given a different pain medicine. (Note: If you have chronic liver or kidney disease or have ever had a stomach ulcer or gastrointestinal bleeding, talk with your healthcare provider before using these medicines. Also talk to your provider if you are taking medicine to prevent blood clots.) Aspirin should never be given to anyone younger than 18 years of age who is ill with a viral infection or fever. It may cause severe liver or brain damage.  Reducing stress  If stress seems to be contributing to your symptoms, try to identify the sources of stress in your life. These arent always obvious. Common stressors include:  · Everyday hassles (which can add up), such as traffic jams, missed appointments, or car trouble  · Major life changes, both good (such as a new baby or job promotion) and bad (loss of job or loss of a loved one)  · Overload: The feeling that you have too many responsibilities and can't take care of everything at once  · Helplessness: Feeling like your problems are more than you can solve  When possible, do something about your sources of stress. See if you can avoid hassles, limit the amount of change in your life at one time, and take breaks when you feel overloaded.  Unfortunately, many stressful situations cannot be avoided. So learning how to manage stress better is very important. Getting regular exercise, eating nutritious, balanced meals, and getting adequate rest all help to make everyday stress more manageable. Certain techniques are also helpful: relaxation and breathing exercises, visualization, biofeedback, meditation, or simply taking some time out to clear your mind. For more information, talk with your healthcare provider.  Follow-up care  Follow up with your healthcare provider, or as advised. Further testing and  additional treatment may be required. If changes to your lifestyle do not improve your symptoms, talk with your healthcare provider about other available therapies. These include bite guards for help with teeth grinding, stress management techniques, and more. If stress is an important factor and does not respond to the above simple measures, talk to your doctor about a referral for stress management.  · If X-rays were done, they will be reviewed by a specialist. You will be notified of the results, especially if they affect treatment.  Call 911  Call emergency services right away if any of these occur:  · Trouble breathing or swallowing, wheezing  · Confusion  · Extreme drowsiness or trouble awakening  · Fainting or loss of consciousness  · Rapid heart rate  When to seek medical advice  Call your healthcare provider right away if any of these occur:  · Your face becomes swollen or red.  · Your pain worsens.  · You have increasing neck, mouth, tooth, or throat pain.  · You develop a fever of 100.4F (38°C) or higher  Date Last Reviewed: 7/30/2015  © 4875-5750 [employer]. [address] [place] [address], [place] [address]. All rights reserved. This information is not intended as a substitute for professional medical care. Always follow your healthcare professional's instructions.

## 2019-06-12 ENCOUNTER — OFFICE VISIT (OUTPATIENT)
Dept: URGENT CARE | Facility: CLINIC | Age: 34
End: 2019-06-12
Payer: COMMERCIAL

## 2019-06-12 VITALS
DIASTOLIC BLOOD PRESSURE: 83 MMHG | BODY MASS INDEX: 28.49 KG/M2 | HEIGHT: 74 IN | WEIGHT: 222 LBS | SYSTOLIC BLOOD PRESSURE: 122 MMHG | OXYGEN SATURATION: 99 % | TEMPERATURE: 98 F | HEART RATE: 69 BPM | RESPIRATION RATE: 18 BRPM

## 2019-06-12 DIAGNOSIS — T63.481A INSECT STINGS, ACCIDENTAL OR UNINTENTIONAL, INITIAL ENCOUNTER: Primary | ICD-10-CM

## 2019-06-12 PROCEDURE — 3008F PR BODY MASS INDEX (BMI) DOCUMENTED: ICD-10-PCS | Mod: CPTII,S$GLB,, | Performed by: EMERGENCY MEDICINE

## 2019-06-12 PROCEDURE — 96372 PR INJECTION,THERAP/PROPH/DIAG2ST, IM OR SUBCUT: ICD-10-PCS | Mod: S$GLB,,, | Performed by: EMERGENCY MEDICINE

## 2019-06-12 PROCEDURE — 96372 THER/PROPH/DIAG INJ SC/IM: CPT | Mod: S$GLB,,, | Performed by: EMERGENCY MEDICINE

## 2019-06-12 PROCEDURE — 99214 PR OFFICE/OUTPT VISIT, EST, LEVL IV, 30-39 MIN: ICD-10-PCS | Mod: 25,S$GLB,, | Performed by: EMERGENCY MEDICINE

## 2019-06-12 PROCEDURE — 3008F BODY MASS INDEX DOCD: CPT | Mod: CPTII,S$GLB,, | Performed by: EMERGENCY MEDICINE

## 2019-06-12 PROCEDURE — 99214 OFFICE O/P EST MOD 30 MIN: CPT | Mod: 25,S$GLB,, | Performed by: EMERGENCY MEDICINE

## 2019-06-12 RX ORDER — TRIAMCINOLONE ACETONIDE 5 MG/G
CREAM TOPICAL 3 TIMES DAILY
Qty: 30 G | Refills: 1 | Status: SHIPPED | OUTPATIENT
Start: 2019-06-12 | End: 2019-07-27 | Stop reason: ALTCHOICE

## 2019-06-12 RX ORDER — BETAMETHASONE SODIUM PHOSPHATE AND BETAMETHASONE ACETATE 3; 3 MG/ML; MG/ML
9 INJECTION, SUSPENSION INTRA-ARTICULAR; INTRALESIONAL; INTRAMUSCULAR; SOFT TISSUE
Status: COMPLETED | OUTPATIENT
Start: 2019-06-12 | End: 2019-06-12

## 2019-06-12 RX ADMIN — BETAMETHASONE SODIUM PHOSPHATE AND BETAMETHASONE ACETATE 9 MG: 3; 3 INJECTION, SUSPENSION INTRA-ARTICULAR; INTRALESIONAL; INTRAMUSCULAR; SOFT TISSUE at 07:06

## 2019-06-13 NOTE — PROGRESS NOTES
"Subjective:       Patient ID: Yg Oh is a 33 y.o. male.    Vitals:    06/12/19 1913   BP: 122/83   Pulse: 69   Resp: 18   Temp: 98.4 °F (36.9 °C)   SpO2: 99%   Weight: 100.7 kg (222 lb)   Height: 6' 2" (1.88 m)       Chief Complaint: Insect Bite (LT HAND YESTERDAY )    Insect Bite   This is a new (BEE STING YESTERDAY ) problem. The current episode started yesterday. The problem has been gradually worsening. Pertinent negatives include no chills, fever, rash or sore throat. Exacerbated by: TOUCHING. Treatments tried: OTC MEDS  The treatment provided no relief.     Review of Systems   Constitution: Negative for chills and fever.   HENT: Negative for sore throat.    Respiratory: Negative for shortness of breath.    Skin: Negative for itching and rash.   Musculoskeletal: Negative for joint pain.       Objective:      Physical Exam   Constitutional: He is oriented to person, place, and time. He appears well-developed and well-nourished.   HENT:   Head: Normocephalic and atraumatic. Head is without abrasion, without contusion and without laceration.   Right Ear: External ear normal.   Left Ear: External ear normal.   Nose: Nose normal.   Mouth/Throat: Oropharynx is clear and moist.   Eyes: Pupils are equal, round, and reactive to light. Conjunctivae, EOM and lids are normal.   Neck: Trachea normal, full passive range of motion without pain and phonation normal. Neck supple.   Cardiovascular: Normal rate, regular rhythm and normal heart sounds.   Pulmonary/Chest: Effort normal and breath sounds normal. No stridor. No respiratory distress.   Musculoskeletal:        Right hand: He exhibits decreased range of motion and swelling. He exhibits no tenderness, no bony tenderness, normal two-point discrimination, normal capillary refill, no deformity and no laceration.        Hands:  Patient has an insect sting to the volar aspect of his left thumb over the proximal phalanx, there is surrounding edema with faint erythema.  " The area is nontender there is no abscess formation he is neurovascularly intact is appearance is consistent with a local reaction to insect sting.   Neurological: He is alert and oriented to person, place, and time.   Skin: Skin is warm, dry and intact. Capillary refill takes less than 2 seconds. No abrasion, no bruising, no burn, no ecchymosis, no laceration, no lesion and no rash noted. No erythema.   Psychiatric: He has a normal mood and affect. His speech is normal and behavior is normal. Judgment and thought content normal. Cognition and memory are normal.   Nursing note and vitals reviewed.      Assessment:       1. Insect stings, accidental or unintentional, initial encounter        Plan:       Yg was seen today for insect bite.    Diagnoses and all orders for this visit:    Insect stings, accidental or unintentional, initial encounter  -     INELASTIC VELCRO WRAP    Other orders  -     betamethasone acetate-betamethasone sodium phosphate injection 9 mg  -     triamcinolone acetonide 0.5% (KENALOG) 0.5 % Crea; Apply topically 3 (three) times daily. for 7 days          Patient Instructions     Return to Clinic for worsening symptoms or signs of infection    Benadryl OTC as directed for 7 days    Claritin, Zyrtec, or Allegra OTC as directed for 7 days      Insect Sting: Local Reaction   You have been stung or bitten by an insect. The insects venom or body fluid is causing your skin to react in the area where you were stung or bitten. This often causes redness, itching and swelling. This reaction will fade over a few hours, but it can last a few days. An insect bite or sting can become infected 1 to 3 days later, so watch for the signs below. Sometimes it is hard to tell the difference between a local reaction to the insect bite or sting and an early infection, so you may be given antibiotics.  Common insect stings causing problems are from wasps, bees, yellow jackets, and hornets. Common bites are from  spiders, mosquitoes, fleas, or ticks. Other types of insects may be more common in different parts of the country or world.  Most people think of allergic reactions when someone has a rash or itchy skin. Symptoms can include:  · Rash, hives, redness, welts, or blisters  · Itching, burning, stinging, or pain  · Swelling around the sting area.  Sometimes swelling spreads to other areas.  Home care  Medicines  The healthcare provider may prescribe medicines to relieve swelling, itching, and pain. Follow the providers instructions when taking these medicines.  · If you had a severe reaction, the provider may prescribe an epinephrine kit. Epinephrine will stop an allergic reaction from getting worse. Before you leave the hospital, be sure that you understand when and how to use this medicine.  · Diphenhydramine is an oral antihistamine available at drugstores and groceries. Unless a prescription antihistamine was given, you can use this medicine to reduce itching if large areas of the skin are involved. The medicine may make you sleepy, so be careful using it in the daytime or when going to school, working, or driving. Dont use diphenhydramine if you have glaucoma or if you are a man with trouble urinating because of an enlarged prostate. Other antihistamines cause less drowsiness and are good choices for daytime use. Ask your pharmacist for suggestions.  · Dont use diphenhydramine cream on your skin. In some people it can cause additional reaction and make you allergic to this medicine.  · Calamine lotion or oatmeal baths sometimes help with itching.  · You may use acetaminophen or ibuprofen to control pain, unless another pain medicine was prescribed. Talk with your healthcare provider before using these medicines if you have chronic liver or kidney disease. Also talk with your provider if youve had a stomach ulcer or GI bleeding.  General care    · If itching is a problem, dont take hot showers or baths. Stay out  of direct sunlight. These heat up your skin and will make the itching worse.  · Use an ice pack to reduce local areas of redness and itching. You can make your own ice pack by putting ice cubes in a bag that seals and wrapping it in a thin towel. Dont put the ice directly on your skin, because it can damage the skin.  · Try not to scratch any affected areas and damage the skin. This will help prevent an infection.  · If oral antibiotics were prescribed, be sure to take them until finished.  Preventing future reactions  · Future reactions could be worse than this one, so try to stay away from places where you might be stung again.  · Be aware that honeybees nest in trees. Wasps and yellow jackets nest in the ground, trees, or roof eaves.  · If you are stung by a honeybee, a stinger will remain in your skin. Wasps, yellow jackets, and hornets dont leave a stinger behind. Move away from the nest area right away. The stinger of a honeybee releases a substance that will attract other bees to you. Once you are away from the nest, then remove the stinger as quickly as possible.  · After any sting, you may apply ice and take diphenhydramine or another antihistamine. If you develop any of the warning signs below, seek help right away.  · If you are at high risk for another sting, or if your reaction included dizziness, fainting, or trouble breathing or swallowing, ask your doctor for an insect allergy kit.  · Remove any ticks on the skin with a set of fine tweezers.  the tick as close to the skin as possible. Pull back gently but firmly. Use an even, steady pressure. Dont jerk or twist. Dont squeeze, crush, or puncture the body of the tick. The bodily fluids may contain infection-causing germs. Dont use a smoldering match or cigarette, nail polish, petroleum jelly, liquid soap, or kerosene. These may irritate the tick. If any mouthparts of the tick remain in the skin, these should be left alone. They will fall off  on their own. Trying to remove these parts may damage the skin unless they can be removed very easily. After the tick is removed, wash the bite area with rubbing alcohol, iodine, or soap and water.  Follow-up care  Follow up with your doctor in 2 days, or as advised, if your symptoms dont start to get better.  Call 911  Call 911 if any of these occur:  · Trouble breathing or swallowing, or wheezing  · New or worsening swelling in the mouth, throat, or tongue  · Hoarse voice or trouble speaking  · Confused  · Very drowsy or trouble awakening  · Fainting or loss of consciousness  · Rapid heart rate  · Low blood pressure  · Feeling of doom  · Nausea, vomiting, abdominal pain, or diarrhea  · Vomiting blood, or large amounts of blood in stool  · Seizure  When to seek medical advice  Call your healthcare provider right away if any of these occur:  · Spreading areas of itching, redness or swelling  · New or worse swelling in the face, eyelids, or  lips  · Dizziness or weakness  Also call your provider right away if you have signs of infection:  · Spreading redness  · Increased pain or swelling  · Fever of 100.4°F (38°C) or higher, or as directed by your healthcare provider  · Colored fluid draining from the sting area   Date Last Reviewed: 10/1/2016  © 7284-3555 [employer]. [address] [place] [address], [place] [address]. All rights reserved. This information is not intended as a substitute for professional medical care. Always follow your healthcare professional's instructions.

## 2019-06-13 NOTE — PATIENT INSTRUCTIONS
Return to Clinic for worsening symptoms or signs of infection    Benadryl OTC as directed for 7 days    Claritin, Zyrtec, or Allegra OTC as directed for 7 days      Insect Sting: Local Reaction   You have been stung or bitten by an insect. The insects venom or body fluid is causing your skin to react in the area where you were stung or bitten. This often causes redness, itching and swelling. This reaction will fade over a few hours, but it can last a few days. An insect bite or sting can become infected 1 to 3 days later, so watch for the signs below. Sometimes it is hard to tell the difference between a local reaction to the insect bite or sting and an early infection, so you may be given antibiotics.  Common insect stings causing problems are from wasps, bees, yellow jackets, and hornets. Common bites are from spiders, mosquitoes, fleas, or ticks. Other types of insects may be more common in different parts of the country or world.  Most people think of allergic reactions when someone has a rash or itchy skin. Symptoms can include:  · Rash, hives, redness, welts, or blisters  · Itching, burning, stinging, or pain  · Swelling around the sting area.  Sometimes swelling spreads to other areas.  Home care  Medicines  The healthcare provider may prescribe medicines to relieve swelling, itching, and pain. Follow the providers instructions when taking these medicines.  · If you had a severe reaction, the provider may prescribe an epinephrine kit. Epinephrine will stop an allergic reaction from getting worse. Before you leave the hospital, be sure that you understand when and how to use this medicine.  · Diphenhydramine is an oral antihistamine available at drugstores and groceries. Unless a prescription antihistamine was given, you can use this medicine to reduce itching if large areas of the skin are involved. The medicine may make you sleepy, so be careful using it in the daytime or when going to school, working,  or driving. Dont use diphenhydramine if you have glaucoma or if you are a man with trouble urinating because of an enlarged prostate. Other antihistamines cause less drowsiness and are good choices for daytime use. Ask your pharmacist for suggestions.  · Dont use diphenhydramine cream on your skin. In some people it can cause additional reaction and make you allergic to this medicine.  · Calamine lotion or oatmeal baths sometimes help with itching.  · You may use acetaminophen or ibuprofen to control pain, unless another pain medicine was prescribed. Talk with your healthcare provider before using these medicines if you have chronic liver or kidney disease. Also talk with your provider if youve had a stomach ulcer or GI bleeding.  General care    · If itching is a problem, dont take hot showers or baths. Stay out of direct sunlight. These heat up your skin and will make the itching worse.  · Use an ice pack to reduce local areas of redness and itching. You can make your own ice pack by putting ice cubes in a bag that seals and wrapping it in a thin towel. Dont put the ice directly on your skin, because it can damage the skin.  · Try not to scratch any affected areas and damage the skin. This will help prevent an infection.  · If oral antibiotics were prescribed, be sure to take them until finished.  Preventing future reactions  · Future reactions could be worse than this one, so try to stay away from places where you might be stung again.  · Be aware that honeybees nest in trees. Wasps and yellow jackets nest in the ground, trees, or roof eaves.  · If you are stung by a honeybee, a stinger will remain in your skin. Wasps, yellow jackets, and hornets dont leave a stinger behind. Move away from the nest area right away. The stinger of a honeybee releases a substance that will attract other bees to you. Once you are away from the nest, then remove the stinger as quickly as possible.  · After any sting, you may  apply ice and take diphenhydramine or another antihistamine. If you develop any of the warning signs below, seek help right away.  · If you are at high risk for another sting, or if your reaction included dizziness, fainting, or trouble breathing or swallowing, ask your doctor for an insect allergy kit.  · Remove any ticks on the skin with a set of fine tweezers.  the tick as close to the skin as possible. Pull back gently but firmly. Use an even, steady pressure. Dont jerk or twist. Dont squeeze, crush, or puncture the body of the tick. The bodily fluids may contain infection-causing germs. Dont use a smoldering match or cigarette, nail polish, petroleum jelly, liquid soap, or kerosene. These may irritate the tick. If any mouthparts of the tick remain in the skin, these should be left alone. They will fall off on their own. Trying to remove these parts may damage the skin unless they can be removed very easily. After the tick is removed, wash the bite area with rubbing alcohol, iodine, or soap and water.  Follow-up care  Follow up with your doctor in 2 days, or as advised, if your symptoms dont start to get better.  Call 911  Call 911 if any of these occur:  · Trouble breathing or swallowing, or wheezing  · New or worsening swelling in the mouth, throat, or tongue  · Hoarse voice or trouble speaking  · Confused  · Very drowsy or trouble awakening  · Fainting or loss of consciousness  · Rapid heart rate  · Low blood pressure  · Feeling of doom  · Nausea, vomiting, abdominal pain, or diarrhea  · Vomiting blood, or large amounts of blood in stool  · Seizure  When to seek medical advice  Call your healthcare provider right away if any of these occur:  · Spreading areas of itching, redness or swelling  · New or worse swelling in the face, eyelids, or  lips  · Dizziness or weakness  Also call your provider right away if you have signs of infection:  · Spreading redness  · Increased pain or swelling  · Fever of  100.4°F (38°C) or higher, or as directed by your healthcare provider  · Colored fluid draining from the sting area   Date Last Reviewed: 10/1/2016  © 4027-9078 [employer]. [address] [place] [address], [place] [address]. All rights reserved. This information is not intended as a substitute for professional medical care. Always follow your healthcare professional's instructions.

## 2019-07-27 ENCOUNTER — OFFICE VISIT (OUTPATIENT)
Dept: URGENT CARE | Facility: CLINIC | Age: 34
End: 2019-07-27
Payer: COMMERCIAL

## 2019-07-27 VITALS
OXYGEN SATURATION: 98 % | RESPIRATION RATE: 18 BRPM | HEIGHT: 74 IN | SYSTOLIC BLOOD PRESSURE: 130 MMHG | BODY MASS INDEX: 28.49 KG/M2 | WEIGHT: 222 LBS | HEART RATE: 83 BPM | TEMPERATURE: 98 F | DIASTOLIC BLOOD PRESSURE: 85 MMHG

## 2019-07-27 DIAGNOSIS — L05.91 INFECTED PILONIDAL CYST: Primary | ICD-10-CM

## 2019-07-27 PROCEDURE — 3008F BODY MASS INDEX DOCD: CPT | Mod: CPTII,S$GLB,, | Performed by: FAMILY MEDICINE

## 2019-07-27 PROCEDURE — 99214 OFFICE O/P EST MOD 30 MIN: CPT | Mod: S$GLB,,, | Performed by: FAMILY MEDICINE

## 2019-07-27 PROCEDURE — 3008F PR BODY MASS INDEX (BMI) DOCUMENTED: ICD-10-PCS | Mod: CPTII,S$GLB,, | Performed by: FAMILY MEDICINE

## 2019-07-27 PROCEDURE — 99214 PR OFFICE/OUTPT VISIT, EST, LEVL IV, 30-39 MIN: ICD-10-PCS | Mod: S$GLB,,, | Performed by: FAMILY MEDICINE

## 2019-07-27 RX ORDER — CLINDAMYCIN HYDROCHLORIDE 300 MG/1
300 CAPSULE ORAL 3 TIMES DAILY
Qty: 21 CAPSULE | Refills: 0 | Status: SHIPPED | OUTPATIENT
Start: 2019-07-27 | End: 2019-08-03

## 2019-07-27 RX ORDER — MUPIROCIN 20 MG/G
OINTMENT TOPICAL 3 TIMES DAILY
Qty: 30 G | Refills: 0 | Status: SHIPPED | OUTPATIENT
Start: 2019-07-27 | End: 2019-08-03

## 2019-07-27 NOTE — PATIENT INSTRUCTIONS
PLEASE READ YOUR DISCHARGE INSTRUCTIONS ENTIRELY AS IT CONTAINS IMPORTANT INFORMATION.    Take the clindamycin 3 times daily with food    Bactroban 3 times daily    Keep clean    Warm compresses    A referral to colorectal surgery has been placed for you.  Please call [phone] to make an appt        You must understand that you have received an Urgent Care treatment only and that you may be released before all of your medical problems are known or treated.    Pilonidal Cyst, Infected (Antibiotic Treatment)  A pilonidal cyst is a swelling that starts under the skin on the sacrum near the tailbone. It may look like a small dimple. It can fill with skin oils, hair, and dead skin cells. It may stay small or grow larger. It may become infected with normal skin bacteria because it often has an opening to the surface.  Causes  The cause of pilonidal cysts has been debated since they were first recognized. A cyst may be present at birth and go unnoticed. Injury, rubbing, or skin irritation may also cause pilonidal cysts. It can also be caused by an ingrown hair. The cause is most likely a combination of these things. Because some injury or irritation can lead to pilonidal cysts, they can be more common in people who sit or drive a lot for work.  Symptoms  A pilonidal cyst may be small and painless. If it becomes inflamed or infected, you may have these symptoms:  · Swelling  · Irritation or redness  · Pain  · Drainage  The cyst can swell and drain on its own. The swelling and drainage can come and go.  Treatment  A limited infection can be treated with antibiotics and home care. You have been given antibiotics to treat your infected pilonidal cyst.  Home care  The following guidelines will help you care for your wound at home:  · Sit in a tub filled with about 6 inches of hot water. Keep the water hot for 10 to 15 minutes.  · Don't squeeze the pilonidal cyst or stick a needle in it to drain it. This will make the  infection worse, or spread it.  · Cover the cyst with a pad or something similar to keep it from becoming more irritated, damaged, and painful.  Medicines  · Take acetaminophen or ibuprofen for pain, unless you were given a different pain medicine to use. Talk with your doctor before using these medicines if you have chronic liver or kidney disease, or have ever had a stomach ulcer or digestive bleeding. Also talk with your doctor if you are taking blood thinner medicines.  · Take the antibiotics that you were prescribed until they are all gone. To make sure the infection is cured, it is important to finish the antibiotics even if the wound looks better.  · Use antibiotic cream or ointment if your healthcare provider tells you to.    Preventing future infections  Once this infection has healed, follow these tips to lower the risk for another infection:  · Keep the area of the cyst clean by bathing or showering every day.  · Don't wear tight-fitting clothing. This will help lessen sweat and irritation of the skin.  · You may need surgery to completely remove the cyst if it keeps coming back. The surgery can only be done when the cyst is not infected. Ask your doctor for more information.  · Watch for signs of infection listed below so that treatment may be started early.  Follow-up care  Follow up with your healthcare provider, or as advised. Check your wound every day for the signs listed below.  When to seek medical advice  Call your healthcare provider right away if any of these occur:  · Pus coming from the cyst  · Increasing local pain, redness, or swelling  · Fever of 100.4°F (38.0°C) or higher for more than 2 days, or as directed by your healthcare provider  Date Last Reviewed: 12/1/2016 © 2000-2017 [employer]. [address] [place] [address], [place] [address]. All rights reserved. This information is not intended as a substitute for professional medical care. Always follow your healthcare  professional's instructions.

## 2019-07-27 NOTE — PROGRESS NOTES
"Subjective:       Patient ID: Yg Oh is a 33 y.o. male.    Vitals:  height is 6' 2" (1.88 m) and weight is 100.7 kg (222 lb). His oral temperature is 98.3 °F (36.8 °C). His blood pressure is 130/85 and his pulse is 83. His respiration is 18 and oxygen saturation is 98%.     Chief Complaint: Abscess    Patient present today with what he thinks is an abscess on the top of buttock. Patient minor discomfort 6 years ago in the exact same area, had looked at in a clinic said it did not need to be drained. Few days ago started feeling discomfort again patient has been taking nothing to help relieve pain. Did squeeze some pus and blood out of it, minimal amount. No fever.         Abscess   Chronicity:  RecurrentProgression Since Onset: rapidly worsening  Abscess location: buttocks.  Associated Symptoms: no fever, no chills  Pain Scale:  5/10  Treatments Tried:  Nothing  Relieved by:  Nothing  Worsened by:  Nothing      Constitution: Negative for chills, fatigue and fever.   HENT: Negative for congestion and sore throat.    Neck: Negative for painful lymph nodes.   Cardiovascular: Negative for chest pain and leg swelling.   Eyes: Negative for double vision and blurred vision.   Respiratory: Negative for cough and shortness of breath.    Gastrointestinal: Negative for nausea, vomiting and diarrhea.   Genitourinary: Negative for dysuria, frequency and urgency.   Musculoskeletal: Negative for joint pain, joint swelling, muscle cramps and muscle ache.   Skin: Positive for abscess. Negative for color change, pale, rash and erythema.   Allergic/Immunologic: Negative for seasonal allergies.   Neurological: Negative for dizziness, history of vertigo, light-headedness, passing out and headaches.   Hematologic/Lymphatic: Negative for swollen lymph nodes, easy bruising/bleeding and history of blood clots. Does not bruise/bleed easily.   Psychiatric/Behavioral: Negative for nervous/anxious, sleep disturbance and depression. The " patient is not nervous/anxious.        Objective:      Physical Exam   Constitutional: He is oriented to person, place, and time. He appears well-developed and well-nourished.   HENT:   Head: Normocephalic and atraumatic. Head is without abrasion, without contusion and without laceration.   Right Ear: External ear normal.   Left Ear: External ear normal.   Nose: Nose normal.   Mouth/Throat: Oropharynx is clear and moist.   Eyes: Pupils are equal, round, and reactive to light. Conjunctivae, EOM and lids are normal.   Neck: Trachea normal, full passive range of motion without pain and phonation normal. Neck supple.   Cardiovascular: Normal rate, regular rhythm and normal heart sounds.   Pulmonary/Chest: Effort normal and breath sounds normal. No stridor. No respiratory distress.   Musculoskeletal: Normal range of motion.   Neurological: He is alert and oriented to person, place, and time.   Skin: Skin is warm, dry and intact. Capillary refill takes less than 2 seconds. No abrasion, no bruising, no burn, no ecchymosis, no laceration, no lesion and no rash noted. No erythema.        Elena present for exam   Psychiatric: He has a normal mood and affect. His speech is normal and behavior is normal. Judgment and thought content normal. Cognition and memory are normal.   Nursing note and vitals reviewed.      Assessment:       1. Infected pilonidal cyst        Plan:         Infected pilonidal cyst  -     clindamycin (CLEOCIN) 300 MG capsule; Take 1 capsule (300 mg total) by mouth 3 (three) times daily. for 7 days  Dispense: 21 capsule; Refill: 0  -     mupirocin (BACTROBAN) 2 % ointment; Apply topically 3 (three) times daily. for 7 days  Dispense: 30 g; Refill: 0  -     Ambulatory referral to Colorectal Surgery    urgent referral to CRS placed, will start on abx, advised warm compresses to the area    Patient Instructions   PLEASE READ YOUR DISCHARGE INSTRUCTIONS ENTIRELY AS IT CONTAINS IMPORTANT INFORMATION.    Take the  clindamycin 3 times daily with food    Bactroban 3 times daily    Keep clean    Warm compresses    A referral to colorectal surgery has been placed for you.  Please call [phone] to make an appt        You must understand that you have received an Urgent Care treatment only and that you may be released before all of your medical problems are known or treated.    Pilonidal Cyst, Infected (Antibiotic Treatment)  A pilonidal cyst is a swelling that starts under the skin on the sacrum near the tailbone. It may look like a small dimple. It can fill with skin oils, hair, and dead skin cells. It may stay small or grow larger. It may become infected with normal skin bacteria because it often has an opening to the surface.  Causes  The cause of pilonidal cysts has been debated since they were first recognized. A cyst may be present at birth and go unnoticed. Injury, rubbing, or skin irritation may also cause pilonidal cysts. It can also be caused by an ingrown hair. The cause is most likely a combination of these things. Because some injury or irritation can lead to pilonidal cysts, they can be more common in people who sit or drive a lot for work.  Symptoms  A pilonidal cyst may be small and painless. If it becomes inflamed or infected, you may have these symptoms:  · Swelling  · Irritation or redness  · Pain  · Drainage  The cyst can swell and drain on its own. The swelling and drainage can come and go.  Treatment  A limited infection can be treated with antibiotics and home care. You have been given antibiotics to treat your infected pilonidal cyst.  Home care  The following guidelines will help you care for your wound at home:  · Sit in a tub filled with about 6 inches of hot water. Keep the water hot for 10 to 15 minutes.  · Don't squeeze the pilonidal cyst or stick a needle in it to drain it. This will make the infection worse, or spread it.  · Cover the cyst with a pad or something similar to keep it from  becoming more irritated, damaged, and painful.  Medicines  · Take acetaminophen or ibuprofen for pain, unless you were given a different pain medicine to use. Talk with your doctor before using these medicines if you have chronic liver or kidney disease, or have ever had a stomach ulcer or digestive bleeding. Also talk with your doctor if you are taking blood thinner medicines.  · Take the antibiotics that you were prescribed until they are all gone. To make sure the infection is cured, it is important to finish the antibiotics even if the wound looks better.  · Use antibiotic cream or ointment if your healthcare provider tells you to.    Preventing future infections  Once this infection has healed, follow these tips to lower the risk for another infection:  · Keep the area of the cyst clean by bathing or showering every day.  · Don't wear tight-fitting clothing. This will help lessen sweat and irritation of the skin.  · You may need surgery to completely remove the cyst if it keeps coming back. The surgery can only be done when the cyst is not infected. Ask your doctor for more information.  · Watch for signs of infection listed below so that treatment may be started early.  Follow-up care  Follow up with your healthcare provider, or as advised. Check your wound every day for the signs listed below.  When to seek medical advice  Call your healthcare provider right away if any of these occur:  · Pus coming from the cyst  · Increasing local pain, redness, or swelling  · Fever of 100.4°F (38.0°C) or higher for more than 2 days, or as directed by your healthcare provider  Date Last Reviewed: 12/1/2016  © 6344-4545 [employer]. [address] [place] [address], [place] [address]. All rights reserved. This information is not intended as a substitute for professional medical care. Always follow your healthcare professional's instructions.

## 2019-07-29 ENCOUNTER — OFFICE VISIT (OUTPATIENT)
Dept: SURGERY | Facility: CLINIC | Age: 34
End: 2019-07-29
Payer: COMMERCIAL

## 2019-07-29 VITALS
WEIGHT: 226.63 LBS | DIASTOLIC BLOOD PRESSURE: 74 MMHG | BODY MASS INDEX: 28.18 KG/M2 | HEART RATE: 75 BPM | SYSTOLIC BLOOD PRESSURE: 131 MMHG | HEIGHT: 75 IN

## 2019-07-29 DIAGNOSIS — L05.01 PILONIDAL CYST WITH ABSCESS: ICD-10-CM

## 2019-07-29 PROCEDURE — 99203 OFFICE O/P NEW LOW 30 MIN: CPT | Mod: S$GLB,,, | Performed by: COLON & RECTAL SURGERY

## 2019-07-29 PROCEDURE — 3008F PR BODY MASS INDEX (BMI) DOCUMENTED: ICD-10-PCS | Mod: CPTII,S$GLB,, | Performed by: COLON & RECTAL SURGERY

## 2019-07-29 PROCEDURE — 99203 PR OFFICE/OUTPT VISIT, NEW, LEVL III, 30-44 MIN: ICD-10-PCS | Mod: S$GLB,,, | Performed by: COLON & RECTAL SURGERY

## 2019-07-29 PROCEDURE — 3008F BODY MASS INDEX DOCD: CPT | Mod: CPTII,S$GLB,, | Performed by: COLON & RECTAL SURGERY

## 2019-07-29 PROCEDURE — 99999 PR PBB SHADOW E&M-EST. PATIENT-LVL III: ICD-10-PCS | Mod: PBBFAC,,, | Performed by: COLON & RECTAL SURGERY

## 2019-07-29 PROCEDURE — 99999 PR PBB SHADOW E&M-EST. PATIENT-LVL III: CPT | Mod: PBBFAC,,, | Performed by: COLON & RECTAL SURGERY

## 2019-07-29 NOTE — LETTER
July 29, 2019      Sascha Palacios, NP  [address] [facility] [place] [address]           [facility]  [address]  [facility] [address]  Phone: [phone]          Patient: Yg Oh   MR Number: [id number]   YOB: 1985   Date of Visit: 7/29/2019       Dear Sascha Palacios:    Thank you for referring Yg Oh to me for evaluation. Attached you will find relevant portions of my assessment and plan of care.    If you have questions, please do not hesitate to call me. I look forward to following Yg Oh along with you.    Sincerely,    Ming Dutton MD    Enclosure  CC:  Shanna Rodrigues MD    If you would like to receive this communication electronically, please contact [email or web address] or [phone] to request more information on [employer] Link access.    For providers and/or their staff who would like to refer a patient to [facility], please contact us through our one-stop-shop provider referral line, [facility] , at [phone].    If you feel you have received this communication in error or would no longer like to receive these types of communications, please e-mail [email or web address]

## 2019-07-29 NOTE — PROGRESS NOTES
"CRS Office Visit History and Physical    Referring Md:   Sascha Palacios, Np  [address] [place] [address]    SUBJECTIVE:     Chief Complaint: pilonidal cyst / abscess    History of Present Illness:  The patient is new patient to this practice.   Course is as follows:  Patient is a 33 y.o. male presents with about 4 days of worsening pain he associates with pilonidal cyst. He saw urgent care, was referred to CRS and started on clindamycin. Has not noticed any improvement but he's only been taking it for 24h. Was able to express pus from the area but has not tried further as its too tender. He was initially diagnosed with pilonidal disease about 6 years ago by a doctor in [place] who said there was nothing to do at the time. He was not having pain then and has not had any other episodes of pain or swelling at the site.       Review of patient's allergies indicates:  No Known Allergies    No past medical history on file.  No past surgical history on file.  Family History   Problem Relation Age of Onset    Cancer Paternal Aunt         breast cancer    No Known Problems Mother     No Known Problems Father      Social History     Tobacco Use    Smoking status: Never Smoker   Substance Use Topics    Alcohol use: Yes    Drug use: No        Review of Systems:  ROS    OBJECTIVE:     Vital Signs (Most Recent)  /74 (BP Location: Left arm, Patient Position: Sitting, BP Method: Large (Automatic))   Pulse 75   Ht 6' 3" (1.905 m)   Wt 102.8 kg (226 lb 10.1 oz)   BMI 28.33 kg/m²     Physical Exam:  General: White male in no distress   Neuro: alert and oriented x 4.  Moves all extremities.     HEENT: no icterus.  Trachea midline  Respiratory: respirations are even and unlabored  Cardiac: regular rate  Extremities: Warm dry and intact  Skin: no rashes  Anorectal: +3-4cm area of induration with associated pits c/w infected pilonidal cyst. No fluctuance or purulence appreciated      ASSESSMENT/PLAN: "     Yg was seen today for infected pilonidal cyst.    Diagnoses and all orders for this visit:    Pilonidal cyst with abscess      Complete course of antibiotics  Stop topical antibiotics  Warm compresses / sitz baths  RTC in 2 weeks to discuss surgery; call office if having worsening pain, fevers, chills    Chucho Carmichael MD  Colon and Rectal Surgery Fellow     I have interviewed and examined the patient, reviewed the notes and assessments, and/or personally supervised the procedure(s) performed by Dr Carmichael, and I concur with her/his documentation of Yg Oh.  See below addendum for my evaluation and additional findings.    On exam, he has midline gluteal cleft pits c/w pilonidal disease & underlying induration but no fluctuance/crepitus or overlying erythema.  He does not appear to have anything that can be surgically drained at this time.   Recommend competing course of clindamycin as prescribed, warm compresses.    RTO 2 weeks - urged him to call if he develops worsening pain, drainage, F/C in the interim.  If doing well at that point, will discuss elective excision.      Ming Dutton MD, FACS, FASCRS  Senior Staff Surgeon  Department of Colon & Rectal Surgery

## 2019-08-19 ENCOUNTER — OFFICE VISIT (OUTPATIENT)
Dept: SURGERY | Facility: CLINIC | Age: 34
End: 2019-08-19
Payer: COMMERCIAL

## 2019-08-19 VITALS
DIASTOLIC BLOOD PRESSURE: 81 MMHG | HEIGHT: 75 IN | WEIGHT: 227.5 LBS | HEART RATE: 68 BPM | BODY MASS INDEX: 28.29 KG/M2 | SYSTOLIC BLOOD PRESSURE: 116 MMHG

## 2019-08-19 DIAGNOSIS — L05.91 PILONIDAL CYST: Primary | ICD-10-CM

## 2019-08-19 PROCEDURE — 99213 PR OFFICE/OUTPT VISIT, EST, LEVL III, 20-29 MIN: ICD-10-PCS | Mod: S$GLB,,, | Performed by: COLON & RECTAL SURGERY

## 2019-08-19 PROCEDURE — 99999 PR PBB SHADOW E&M-EST. PATIENT-LVL III: ICD-10-PCS | Mod: PBBFAC,,, | Performed by: COLON & RECTAL SURGERY

## 2019-08-19 PROCEDURE — 99999 PR PBB SHADOW E&M-EST. PATIENT-LVL III: CPT | Mod: PBBFAC,,, | Performed by: COLON & RECTAL SURGERY

## 2019-08-19 PROCEDURE — 3008F BODY MASS INDEX DOCD: CPT | Mod: CPTII,S$GLB,, | Performed by: COLON & RECTAL SURGERY

## 2019-08-19 PROCEDURE — 99213 OFFICE O/P EST LOW 20 MIN: CPT | Mod: S$GLB,,, | Performed by: COLON & RECTAL SURGERY

## 2019-08-19 PROCEDURE — 3008F PR BODY MASS INDEX (BMI) DOCUMENTED: ICD-10-PCS | Mod: CPTII,S$GLB,, | Performed by: COLON & RECTAL SURGERY

## 2019-08-19 NOTE — PROGRESS NOTES
Subjective:       Patient ID: Yg Oh is a 33 y.o. male.    Chief Complaint: Pilonidal Disease    HPI  32 yo M seen 7/29/19 for evaluation of acute pilonidal abscess.  He had been seen the day prior at urgent care and started on clindamycin - he reported symptoms for approximately 4 days prior to this with pain and swelling. He was able to express small amount of pus from the area but did not have extensive drainage. At the time of initial evaluation with me he did have midline gluteal cleft pits consistent with pilonidal disease as well as a 3-4 cm area of induration associated with it but there was no fluctuance or crepitus and nothing to drain surgically.  He was advised to continue with the oral antibiotics, use warm compresses, and follow up with me in 2 weeks.    He comes in today for follow-up.  He states that within a few days of seen may his symptoms essentially resolved and he was pain-free.  He did not have any further drainage.  He has backed of resuming normal activities at this point.    Review of patient's allergies indicates:  No Known Allergies    No past medical history on file.    No past surgical history on file.    Current Outpatient Medications   Medication Sig Dispense Refill    ibuprofen (ADVIL,MOTRIN) 800 MG tablet Take 1 tablet (800 mg total) by mouth 3 (three) times daily. 30 tablet 0     No current facility-administered medications for this visit.        Family History   Problem Relation Age of Onset    Cancer Paternal Aunt         breast cancer    No Known Problems Mother     No Known Problems Father        Social History     Socioeconomic History    Marital status:      Spouse name: Not on file    Number of children: Not on file    Years of education: Not on file    Highest education level: Not on file   Occupational History    Not on file   Social Needs    Financial resource strain: Not on file    Food insecurity:     Worry: Not on file     Inability: Not on file     Transportation needs:     Medical: Not on file     Non-medical: Not on file   Tobacco Use    Smoking status: Never Smoker   Substance and Sexual Activity    Alcohol use: Yes    Drug use: No    Sexual activity: Yes     Partners: Female   Lifestyle    Physical activity:     Days per week: Not on file     Minutes per session: Not on file    Stress: Not on file   Relationships    Social connections:     Talks on phone: Not on file     Gets together: Not on file     Attends Qu[employer] service: Not on file     Active member of club or organization: Not on file     Attends meetings of clubs or organizations: Not on file     Relationship status: Not on file   Other Topics Concern    Not on file   Social History Narrative    Not on file       Review of Systems   Constitutional: Negative for chills and fever.   HENT: Negative for congestion and sinus pressure.    Eyes: Negative for visual disturbance.   Respiratory: Negative for cough and shortness of breath.    Cardiovascular: Negative for chest pain and palpitations.   Gastrointestinal: Negative for abdominal distention, abdominal pain, anal bleeding, blood in stool, constipation, diarrhea, nausea, rectal pain and vomiting.   Endocrine: Negative for cold intolerance and heat intolerance.   Genitourinary: Negative for dysuria and frequency.   Musculoskeletal: Negative for arthralgias and back pain.   Skin: Negative for rash.   Allergic/Immunologic: Negative for immunocompromised state.   Neurological: Negative for dizziness, light-headedness and headaches.   Psychiatric/Behavioral: Negative for confusion. The patient is not nervous/anxious.        Objective:      Physical Exam   Constitutional: He is oriented to person, place, and time. He appears well-developed and well-nourished.   HENT:   Head: Normocephalic and atraumatic.   Eyes: Conjunctivae are normal.   Pulmonary/Chest: Effort normal. No respiratory distress.   Abdominal: Soft. He exhibits no distension  and no mass. There is no tenderness. There is no rebound and no guarding.   Neurological: He is alert and oriented to person, place, and time.   Skin: Skin is warm and dry. No erythema.   Midline gluteal cleft pits consistent with pilonidal disease, no surrounding induration/fluctuance/crepitus/erythema.         Lab Results   Component Value Date    WBC 4.87 08/13/2018    HGB 14.8 08/13/2018    HCT 47.1 08/13/2018    MCV 93 08/13/2018     08/13/2018     BMP  Lab Results   Component Value Date     08/13/2018    K 4.4 08/13/2018     08/13/2018    CO2 27 08/13/2018    BUN 14 08/13/2018    CREATININE 1.0 08/13/2018    CALCIUM 9.7 08/13/2018    ANIONGAP 7 (L) 08/13/2018    ESTGFRAFRICA >60.0 08/13/2018    EGFRNONAA >60.0 08/13/2018     CMP  Sodium   Date Value Ref Range Status   08/13/2018 138 136 - 145 mmol/L Final     Potassium   Date Value Ref Range Status   08/13/2018 4.4 3.5 - 5.1 mmol/L Final     Chloride   Date Value Ref Range Status   08/13/2018 104 95 - 110 mmol/L Final     CO2   Date Value Ref Range Status   08/13/2018 27 23 - 29 mmol/L Final     Glucose   Date Value Ref Range Status   08/13/2018 95 70 - 110 mg/dL Final     BUN, Bld   Date Value Ref Range Status   08/13/2018 14 6 - 20 mg/dL Final     Creatinine   Date Value Ref Range Status   08/13/2018 1.0 0.5 - 1.4 mg/dL Final     Calcium   Date Value Ref Range Status   08/13/2018 9.7 8.7 - 10.5 mg/dL Final     Total Protein   Date Value Ref Range Status   08/13/2018 7.4 6.0 - 8.4 g/dL Final     Albumin   Date Value Ref Range Status   08/13/2018 4.2 3.5 - 5.2 g/dL Final     Total Bilirubin   Date Value Ref Range Status   08/13/2018 0.6 0.1 - 1.0 mg/dL Final     Comment:     For infants and newborns, interpretation of results should be based  on gestational age, weight and in agreement with clinical  observations.  Premature Infant recommended reference ranges:  Up to 24 hours.............<8.0 mg/dL  Up to 48 hours............<12.0  mg/dL  3-5 days..................<15.0 mg/dL  6-29 days.................<15.0 mg/dL       Alkaline Phosphatase   Date Value Ref Range Status   08/13/2018 75 55 - 135 U/L Final     AST   Date Value Ref Range Status   08/13/2018 19 10 - 40 U/L Final     ALT   Date Value Ref Range Status   08/13/2018 21 10 - 44 U/L Final     Anion Gap   Date Value Ref Range Status   08/13/2018 7 (L) 8 - 16 mmol/L Final     eGFR if    Date Value Ref Range Status   08/13/2018 >60.0 >60 mL/min/1.73 m^2 Final     eGFR if non    Date Value Ref Range Status   08/13/2018 >60.0 >60 mL/min/1.73 m^2 Final     Comment:     Calculation used to obtain the estimated glomerular filtration  rate (eGFR) is the CKD-EPI equation.        No results found for: CEA        Assessment:       1. Pilonidal cyst        Plan:   This is his 1st episode of infected pilonidal cyst and it was relatively uncomplicated.  We discussed continued observation/expectant management versus a more proactive approach with surgical excision of the cyst.  The cyst appears relatively small and simple, so I would favor simple unroofing and marsupialization as opposed to a wide excision with flap closure. 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  If he was going to do anything surgically he would prefer to do it in late spring or early summer next year prior to the orchestra off season.    I have asked him to call should he have any recurrent symptoms of inflammation or abscess.  If he decides he wants to pursue surgery next year in late spring or early summer I asked him to call to make an appointment to discuss preoperative planning.    Ming Dutton MD, FACS, FASCRS  Senior Staff Surgeon  Department of Colon & Rectal Surgery     This note was created using voice recognition software, and may contain some  unrecognized transcriptional errors.

## 2019-08-19 NOTE — LETTER
August 19, 2019      Shanna Rodrigues MD  [address]  [address]  [facility] [address]           [facility]  [address]  [facility] [address]  Phone: [phone]          Patient: Yg Oh   MR Number: [id number]   YOB: 1985   Date of Visit: 8/19/2019       Dear Dr. Rodrigues:    Thank you for referring Yg Oh to me for evaluation. Attached you will find relevant portions of my assessment and plan of care.    If you have questions, please do not hesitate to call me. I look forward to following Yg Oh along with you.    Sincerely,    Ming Dutton MD    Enclosure  CC:  No Recipients    If you would like to receive this communication electronically, please contact [email or web address] or [phone] to request more information on [email or web address] Link access.    For providers and/or their staff who would like to refer a patient to [facility], please contact us through our one-stop-shop provider referral line, [facility], [place], at [phone].    If you feel you have received this communication in error or would no longer like to receive these types of communications, please e-mail [email or web address]

## 2020-07-16 ENCOUNTER — OFFICE VISIT (OUTPATIENT)
Dept: OTOLARYNGOLOGY | Facility: CLINIC | Age: 35
End: 2020-07-16
Payer: COMMERCIAL

## 2020-07-16 VITALS — DIASTOLIC BLOOD PRESSURE: 82 MMHG | HEART RATE: 69 BPM | SYSTOLIC BLOOD PRESSURE: 124 MMHG

## 2020-07-16 DIAGNOSIS — H61.22 HEARING LOSS DUE TO CERUMEN IMPACTION, LEFT: ICD-10-CM

## 2020-07-16 DIAGNOSIS — H93.8X2 BLOCKED EAR, LEFT: Primary | ICD-10-CM

## 2020-07-16 DIAGNOSIS — Z78.9 HISTORY OF EXCESSIVE CERUMEN: ICD-10-CM

## 2020-07-16 DIAGNOSIS — H61.21 IMPACTED CERUMEN OF RIGHT EAR: ICD-10-CM

## 2020-07-16 PROCEDURE — 99999 PR PBB SHADOW E&M-EST. PATIENT-LVL III: ICD-10-PCS | Mod: PBBFAC,,, | Performed by: OTOLARYNGOLOGY

## 2020-07-16 PROCEDURE — 69210 PR REMOVAL IMPACTED CERUMEN REQUIRING INSTRUMENTATION, UNILATERAL: ICD-10-PCS | Mod: S$GLB,,, | Performed by: OTOLARYNGOLOGY

## 2020-07-16 PROCEDURE — 99999 PR PBB SHADOW E&M-EST. PATIENT-LVL III: CPT | Mod: PBBFAC,,, | Performed by: OTOLARYNGOLOGY

## 2020-07-16 PROCEDURE — 99499 NO LOS: ICD-10-PCS | Mod: S$GLB,,, | Performed by: OTOLARYNGOLOGY

## 2020-07-16 PROCEDURE — 99499 UNLISTED E&M SERVICE: CPT | Mod: S$GLB,,, | Performed by: OTOLARYNGOLOGY

## 2020-07-16 PROCEDURE — 69210 REMOVE IMPACTED EAR WAX UNI: CPT | Mod: S$GLB,,, | Performed by: OTOLARYNGOLOGY

## 2020-07-16 NOTE — PROGRESS NOTES
CC:  Left ear pressure, rule out cerumen impaction  HPI:Mr. Oh is an aussie a mid a semi urgent appointment with me today for evaluation and treatment of her pressure sensation and blockage affecting his left ear.  He suspects a cerumen impaction problem there.  She in fact, I extracted cerumen impaction from his left ear canal in his last visit with me in February 2019.    Audiometry completed at that visit indicated a very mild 4 K bilateral sensorineural hearing loss.   He was  advised to utilize hearing protection/conservation especially in high noise level environments.  He would like to avoid using ear plugs for hearing protection if possible.    History reviewed. No pertinent past medical history.  History reviewed. No pertinent surgical history.  Current Outpatient Medications on File Prior to Visit   Medication Sig Dispense Refill    ibuprofen (ADVIL,MOTRIN) 800 MG tablet Take 1 tablet (800 mg total) by mouth 3 (three) times daily. 30 tablet 0     No current facility-administered medications on file prior to visit.        PE:  General:  Alert and oriented gentleman in no acute distress  Both ears were examined under the microscope in the micro procedure room  Procedure:  A  Semi- occlusive cerumen impaction is carefully extracted from the right ear canal.  A plug of viscous cerumen is removed from left ear canal with large diameter suction and a blunt curette.  Both TMs are clear and intact as visualized.  Audiometry was not performed today      DIAGNOSIS:     ICD-10-CM ICD-9-CM    1. Blocked ear, left  H93.8X2 388.8    2. History of excessive cerumen  Z78.9 V49.89    3. Hearing loss due to cerumen impaction, left  H61.22 389.8      380.4    4. Impacted cerumen of right ear  H61.21 380.4      PLAN:  Cerumen impactions extracted from each ear canal; AS CI > AD CI  Return to clinic yearly for ear cleaning  Hearing conservation with use of headphones briefly discussed; literature provided

## 2020-07-16 NOTE — PATIENT INSTRUCTIONS
Cerumen impactions extracted from each ear canal; AS C.I. > AD C.I.  Return to clinic yearly for ear cleaning  Hearing conservation with use of headphones briefly discussed; literature provided

## 2020-07-20 ENCOUNTER — OFFICE VISIT (OUTPATIENT)
Dept: OTOLARYNGOLOGY | Facility: CLINIC | Age: 35
End: 2020-07-20
Payer: COMMERCIAL

## 2020-07-20 VITALS — HEIGHT: 75 IN | BODY MASS INDEX: 28.23 KG/M2 | WEIGHT: 227 LBS

## 2020-07-20 DIAGNOSIS — H61.032 CHONDRITIS OF AURICLE, LEFT: ICD-10-CM

## 2020-07-20 DIAGNOSIS — H61.22 CERUMEN DEBRIS ON TYMPANIC MEMBRANE OF LEFT EAR: ICD-10-CM

## 2020-07-20 DIAGNOSIS — H60.339 SWIMMER'S EAR, UNSPECIFIED CHRONICITY, UNSPECIFIED LATERALITY: Primary | ICD-10-CM

## 2020-07-20 PROCEDURE — 99999 PR PBB SHADOW E&M-EST. PATIENT-LVL II: CPT | Mod: PBBFAC,,, | Performed by: OTOLARYNGOLOGY

## 2020-07-20 PROCEDURE — 3008F PR BODY MASS INDEX (BMI) DOCUMENTED: ICD-10-PCS | Mod: CPTII,S$GLB,, | Performed by: OTOLARYNGOLOGY

## 2020-07-20 PROCEDURE — 99999 PR PBB SHADOW E&M-EST. PATIENT-LVL II: ICD-10-PCS | Mod: PBBFAC,,, | Performed by: OTOLARYNGOLOGY

## 2020-07-20 PROCEDURE — 99213 PR OFFICE/OUTPT VISIT, EST, LEVL III, 20-29 MIN: ICD-10-PCS | Mod: S$GLB,,, | Performed by: OTOLARYNGOLOGY

## 2020-07-20 PROCEDURE — 3008F BODY MASS INDEX DOCD: CPT | Mod: CPTII,S$GLB,, | Performed by: OTOLARYNGOLOGY

## 2020-07-20 PROCEDURE — 99213 OFFICE O/P EST LOW 20 MIN: CPT | Mod: S$GLB,,, | Performed by: OTOLARYNGOLOGY

## 2020-07-20 RX ORDER — CIPROFLOXACIN 500 MG/1
500 TABLET ORAL EVERY 12 HOURS
Qty: 14 TABLET | Refills: 0 | Status: SHIPPED | OUTPATIENT
Start: 2020-07-20 | End: 2020-07-27

## 2020-07-20 RX ORDER — NEOMYCIN SULFATE, POLYMYXIN B SULFATE AND HYDROCORTISONE 10; 3.5; 1 MG/ML; MG/ML; [USP'U]/ML
3 SUSPENSION/ DROPS AURICULAR (OTIC) 3 TIMES DAILY
Qty: 10 ML | Refills: 0 | Status: SHIPPED | OUTPATIENT
Start: 2020-07-20

## 2020-07-20 NOTE — PROGRESS NOTES
Otology/Neurotology History and Physical     CC: ear pain, left     HPI:  Yg Oh is a 34 y.o. male who was referred to me by No ref. provider found in consultation for left ear pain. He usually has yearly cerumen cleanings as he has narrow canals. On Thursday, he underwent cerumen removal with Dr. Sims and ears felt better. On Saturday, he began having pain in the left ear that has worsened. He denies using any drops for treatment since. He reports feeling ear fullness, feeling somewhat off balance, and ear swelling. He denies otorrhea. No other problems with his ears. Has never had ear surgeries. No tinnitus       Past Medical History  He has no past medical history on file.    Past Surgical History  He has no past surgical history on file.    Family History  His family history includes Cancer in his paternal aunt; No Known Problems in his father and mother.    Social History  He reports that he has never smoked. He does not have any smokeless tobacco history on file. He reports current alcohol use. He reports that he does not use drugs.    Allergies  He has No Known Allergies.    Medications  He has a current medication list which includes the following prescription(s): ibuprofen.    Review of Systems   Constitutional: Negative for activity change, appetite change and fever.   HENT: Positive for ear pain and hearing loss. Negative for congestion, ear discharge, facial swelling, postnasal drip, rhinorrhea, sinus pressure, sore throat and tinnitus.    Eyes: Negative for visual disturbance.   Respiratory: Negative for shortness of breath.    Cardiovascular: Negative for chest pain.   Gastrointestinal: Negative for abdominal pain.   Musculoskeletal: Negative for neck pain.   Skin: Negative for pallor and wound.   Allergic/Immunologic: Negative for environmental allergies and immunocompromised state.   Neurological: Negative for dizziness, speech difficulty and headaches.   Hematological: Does not  "bruise/bleed easily.   Psychiatric/Behavioral: Negative for agitation and confusion. The patient is not nervous/anxious.           Objective:     Ht 6' 3" (1.905 m)   Wt 103 kg (227 lb)   BMI 28.37 kg/m²    Physical Exam  Constitutional: Well appearing/communicating. Voice clear. NAD.  Eyes: EOM I Bilaterally  Head/Face: Normocephalic.  House Brackmann I Bilaterally.  Right Ear: External Auditory Canal WNL,TM w/o masses/lesions/perforations.  Auricle WNL.  Left Ear: see below   Nose: No gross nasal septal deviation. Inferior Turbinates 2+ bilaterally. No septal perforation. No masses/lesions. External nasal skin without masses/lesions.  Oral Cavity: Gingiva/lips WNL. Oral Tongue mobile. Hard Palate WNL.   Oropharynx: Tonsillar fossa/pharyngeal wall without lesions. Posterior oropharynx WNL.  Soft palate without masses. Midline uvula.   Neck/Lymphatic: No LAD I-VI bilaterally.  No thyromegaly.  No masses noted on exam.  Neuro/Psychiatric: AOx3.  Normal mood and affect.   Respiratory: Normal respiratory effort, no stridor/stertor, no retractions noted.      Procedure    Binocular microscopy used to examine ear canal and tympanic membrane.  Auricle and EAC with swelling. There was some residual cerumen on the left, especially inferiorly. Narrow pocket present with debris/hair, removed with suction and alligator forceps. Area was extremely inflamed. Wick placed. Ciprodex drops applied.       Data Reviewed    Dr. Sims's documentation        Assessment:     1. Swimmer's ear, unspecified chronicity, unspecified laterality    2. Chondritis of auricle, left    3. Cerumen debris on tympanic membrane of left ear         Plan:   PO Cipro x 7 days   Cortisporin gtt, AS   Return to clinic for wick removal if doesn't fall out        "

## 2020-10-08 ENCOUNTER — OFFICE VISIT (OUTPATIENT)
Dept: FAMILY MEDICINE | Facility: CLINIC | Age: 35
End: 2020-10-08
Attending: FAMILY MEDICINE
Payer: COMMERCIAL

## 2020-10-08 ENCOUNTER — LAB VISIT (OUTPATIENT)
Dept: LAB | Facility: HOSPITAL | Age: 35
End: 2020-10-08
Attending: FAMILY MEDICINE
Payer: COMMERCIAL

## 2020-10-08 VITALS
HEART RATE: 64 BPM | HEIGHT: 75 IN | DIASTOLIC BLOOD PRESSURE: 74 MMHG | SYSTOLIC BLOOD PRESSURE: 112 MMHG | BODY MASS INDEX: 27.48 KG/M2 | WEIGHT: 221 LBS | OXYGEN SATURATION: 98 %

## 2020-10-08 DIAGNOSIS — Z00.00 ANNUAL PHYSICAL EXAM: ICD-10-CM

## 2020-10-08 DIAGNOSIS — Z00.00 ANNUAL PHYSICAL EXAM: Primary | ICD-10-CM

## 2020-10-08 LAB
ALBUMIN SERPL BCP-MCNC: 4.7 G/DL (ref 3.5–5.2)
ALP SERPL-CCNC: 77 U/L (ref 55–135)
ALT SERPL W/O P-5'-P-CCNC: 30 U/L (ref 10–44)
ANION GAP SERPL CALC-SCNC: 12 MMOL/L (ref 8–16)
AST SERPL-CCNC: 24 U/L (ref 10–40)
BACTERIA #/AREA URNS AUTO: NORMAL /HPF
BASOPHILS # BLD AUTO: 0.01 K/UL (ref 0–0.2)
BASOPHILS NFR BLD: 0.2 % (ref 0–1.9)
BILIRUB SERPL-MCNC: 0.6 MG/DL (ref 0.1–1)
BILIRUB UR QL STRIP: NEGATIVE
BUN SERPL-MCNC: 16 MG/DL (ref 6–20)
CALCIUM SERPL-MCNC: 9.4 MG/DL (ref 8.7–10.5)
CHLORIDE SERPL-SCNC: 102 MMOL/L (ref 95–110)
CLARITY UR REFRACT.AUTO: CLEAR
CO2 SERPL-SCNC: 25 MMOL/L (ref 23–29)
COLOR UR AUTO: YELLOW
CREAT SERPL-MCNC: 1.1 MG/DL (ref 0.5–1.4)
DIFFERENTIAL METHOD: NORMAL
EOSINOPHIL # BLD AUTO: 0.1 K/UL (ref 0–0.5)
EOSINOPHIL NFR BLD: 1.4 % (ref 0–8)
ERYTHROCYTE [DISTWIDTH] IN BLOOD BY AUTOMATED COUNT: 12.7 % (ref 11.5–14.5)
EST. GFR  (AFRICAN AMERICAN): >60 ML/MIN/1.73 M^2
EST. GFR  (NON AFRICAN AMERICAN): >60 ML/MIN/1.73 M^2
GLUCOSE SERPL-MCNC: 87 MG/DL (ref 70–110)
GLUCOSE UR QL STRIP: NEGATIVE
HCT VFR BLD AUTO: 46.3 % (ref 40–54)
HGB BLD-MCNC: 14.8 G/DL (ref 14–18)
HGB UR QL STRIP: NEGATIVE
IMM GRANULOCYTES # BLD AUTO: 0.01 K/UL (ref 0–0.04)
IMM GRANULOCYTES NFR BLD AUTO: 0.2 % (ref 0–0.5)
KETONES UR QL STRIP: NEGATIVE
LEUKOCYTE ESTERASE UR QL STRIP: NEGATIVE
LYMPHOCYTES # BLD AUTO: 2.5 K/UL (ref 1–4.8)
LYMPHOCYTES NFR BLD: 38.1 % (ref 18–48)
MCH RBC QN AUTO: 29.6 PG (ref 27–31)
MCHC RBC AUTO-ENTMCNC: 32 G/DL (ref 32–36)
MCV RBC AUTO: 93 FL (ref 82–98)
MICROSCOPIC COMMENT: NORMAL
MONOCYTES # BLD AUTO: 0.5 K/UL (ref 0.3–1)
MONOCYTES NFR BLD: 6.9 % (ref 4–15)
NEUTROPHILS # BLD AUTO: 3.5 K/UL (ref 1.8–7.7)
NEUTROPHILS NFR BLD: 53.2 % (ref 38–73)
NITRITE UR QL STRIP: NEGATIVE
NRBC BLD-RTO: 0 /100 WBC
PH UR STRIP: 7 [PH] (ref 5–8)
PLATELET # BLD AUTO: 220 K/UL (ref 150–350)
PMV BLD AUTO: 11.8 FL (ref 9.2–12.9)
POTASSIUM SERPL-SCNC: 4 MMOL/L (ref 3.5–5.1)
PROT SERPL-MCNC: 7.8 G/DL (ref 6–8.4)
PROT UR QL STRIP: NEGATIVE
RBC # BLD AUTO: 5 M/UL (ref 4.6–6.2)
SODIUM SERPL-SCNC: 139 MMOL/L (ref 136–145)
SP GR UR STRIP: 1.01 (ref 1–1.03)
TSH SERPL DL<=0.005 MIU/L-ACNC: 0.97 UIU/ML (ref 0.4–4)
URN SPEC COLLECT METH UR: NORMAL
WBC # BLD AUTO: 6.51 K/UL (ref 3.9–12.7)
WBC #/AREA URNS AUTO: 0 /HPF (ref 0–5)

## 2020-10-08 PROCEDURE — 99999 PR PBB SHADOW E&M-EST. PATIENT-LVL III: CPT | Mod: PBBFAC,,, | Performed by: FAMILY MEDICINE

## 2020-10-08 PROCEDURE — 90471 IMMUNIZATION ADMIN: CPT | Mod: S$GLB,,, | Performed by: FAMILY MEDICINE

## 2020-10-08 PROCEDURE — 99395 PR PREVENTIVE VISIT,EST,18-39: ICD-10-PCS | Mod: 25,S$GLB,, | Performed by: FAMILY MEDICINE

## 2020-10-08 PROCEDURE — 99395 PREV VISIT EST AGE 18-39: CPT | Mod: 25,S$GLB,, | Performed by: FAMILY MEDICINE

## 2020-10-08 PROCEDURE — 86703 HIV-1/HIV-2 1 RESULT ANTBDY: CPT

## 2020-10-08 PROCEDURE — 90715 TDAP VACCINE 7 YRS/> IM: CPT | Mod: S$GLB,,, | Performed by: FAMILY MEDICINE

## 2020-10-08 PROCEDURE — 90686 FLU VACCINE (QUAD) GREATER THAN OR EQUAL TO 3YO PRESERVATIVE FREE IM: ICD-10-PCS | Mod: S$GLB,,, | Performed by: FAMILY MEDICINE

## 2020-10-08 PROCEDURE — 81001 URINALYSIS AUTO W/SCOPE: CPT

## 2020-10-08 PROCEDURE — 85025 COMPLETE CBC W/AUTO DIFF WBC: CPT

## 2020-10-08 PROCEDURE — 80053 COMPREHEN METABOLIC PANEL: CPT

## 2020-10-08 PROCEDURE — 90472 TDAP VACCINE GREATER THAN OR EQUAL TO 7YO IM: ICD-10-PCS | Mod: S$GLB,,, | Performed by: FAMILY MEDICINE

## 2020-10-08 PROCEDURE — 86803 HEPATITIS C AB TEST: CPT

## 2020-10-08 PROCEDURE — 99999 PR PBB SHADOW E&M-EST. PATIENT-LVL III: ICD-10-PCS | Mod: PBBFAC,,, | Performed by: FAMILY MEDICINE

## 2020-10-08 PROCEDURE — 84443 ASSAY THYROID STIM HORMONE: CPT

## 2020-10-08 PROCEDURE — 90471 FLU VACCINE (QUAD) GREATER THAN OR EQUAL TO 3YO PRESERVATIVE FREE IM: ICD-10-PCS | Mod: S$GLB,,, | Performed by: FAMILY MEDICINE

## 2020-10-08 PROCEDURE — 90715 TDAP VACCINE GREATER THAN OR EQUAL TO 7YO IM: ICD-10-PCS | Mod: S$GLB,,, | Performed by: FAMILY MEDICINE

## 2020-10-08 PROCEDURE — 36415 COLL VENOUS BLD VENIPUNCTURE: CPT | Mod: PO

## 2020-10-08 PROCEDURE — 90472 IMMUNIZATION ADMIN EACH ADD: CPT | Mod: S$GLB,,, | Performed by: FAMILY MEDICINE

## 2020-10-08 PROCEDURE — 90686 IIV4 VACC NO PRSV 0.5 ML IM: CPT | Mod: S$GLB,,, | Performed by: FAMILY MEDICINE

## 2020-10-08 NOTE — PROGRESS NOTES
Patient was given Tdap IM in Left Deltoid and Influenza IM in Right Deltoid as per orders from MD. Aseptic tech used and pt tolerated well. Pt was monitored for 15 mins with no reaction noted.

## 2020-10-08 NOTE — PROGRESS NOTES
"Subjective:       Patient ID: Yg Oh is a 34 y.o. male.    Chief Complaint: Annual Exam    HPI   Pt is here for annual exam pt is well no sob/cp no change in bowel habits no brbpr  Pt denies difficulty or maintaining urinary stream   Review of Systems   Constitutional: Negative for activity change, chills, fatigue and fever.   HENT: Negative for congestion, ear pain, hearing loss, postnasal drip, rhinorrhea, sinus pressure and sore throat.    Eyes: Negative for photophobia, pain, redness and visual disturbance.   Respiratory: Negative for cough, chest tightness and shortness of breath.    Cardiovascular: Negative for chest pain, palpitations and leg swelling.   Gastrointestinal: Negative for abdominal pain, blood in stool, constipation, diarrhea, nausea and vomiting.   Genitourinary: Negative for decreased urine volume, difficulty urinating, discharge, dysuria, frequency and urgency.   Musculoskeletal: Negative for back pain, joint swelling and neck pain.   Skin: Negative for color change, pallor and rash.   Neurological: Negative for dizziness, seizures, speech difficulty and numbness.   Hematological: Does not bruise/bleed easily.   Psychiatric/Behavioral: Negative for behavioral problems, confusion, decreased concentration and suicidal ideas.       Objective:     /74   Pulse 64   Ht 6' 3" (1.905 m)   Wt 100.2 kg (221 lb)   SpO2 98%   BMI 27.62 kg/m²     Physical Exam  Constitutional:       General: He is not in acute distress.     Appearance: Normal appearance. He is well-developed and normal weight. He is not ill-appearing.   HENT:      Head: Normocephalic and atraumatic.      Right Ear: Tympanic membrane, ear canal and external ear normal. There is no impacted cerumen.      Left Ear: Tympanic membrane, ear canal and external ear normal. There is no impacted cerumen.      Nose: Nose normal.      Mouth/Throat:      Mouth: Mucous membranes are moist.      Pharynx: No oropharyngeal exudate.   Eyes: " "     Pupils: Pupils are equal, round, and reactive to light.   Neck:      Musculoskeletal: Normal range of motion and neck supple.      Thyroid: No thyromegaly.   Cardiovascular:      Rate and Rhythm: Normal rate and regular rhythm.      Heart sounds: Normal heart sounds. No murmur. No friction rub. No gallop.    Pulmonary:      Effort: Pulmonary effort is normal. No respiratory distress.      Breath sounds: Normal breath sounds.   Abdominal:      General: Bowel sounds are normal. There is no distension.      Palpations: Abdomen is soft.      Tenderness: There is no abdominal tenderness. There is no guarding or rebound.   Genitourinary:     Comments: Declined    Musculoskeletal: Normal range of motion.         General: No tenderness.   Skin:     General: Skin is warm and dry.      Findings: No erythema.   Neurological:      General: No focal deficit present.      Mental Status: He is alert and oriented to person, place, and time.      Cranial Nerves: No cranial nerve deficit.      Coordination: Coordination normal.   Psychiatric:         Mood and Affect: Mood normal.         Behavior: Behavior normal.         Thought Content: Thought content normal.         Judgment: Judgment normal.         Assessment:       1. Annual physical exam        Plan:     orders cmp lipid cbc tsh urine  Cont meds   low fat diet  Graded exercise  rtc annually and prn    Health maintenance  Lipid ordered  Flu discussed  Tetanus q 10 years      "This note will not be shared with the patient."   "

## 2020-10-10 LAB
HCV AB SERPL QL IA: NEGATIVE
HIV 1+2 AB+HIV1 P24 AG SERPL QL IA: NEGATIVE

## 2020-10-15 ENCOUNTER — TELEPHONE (OUTPATIENT)
Dept: FAMILY MEDICINE | Facility: CLINIC | Age: 35
End: 2020-10-15

## 2020-10-15 NOTE — TELEPHONE ENCOUNTER
----- Message from Shanna Rodrigues MD sent at 10/13/2020  6:58 AM CDT -----  Please let pt know nothing acute on labs please send to pt if he would like copies

## 2023-02-01 ENCOUNTER — PATIENT OUTREACH (OUTPATIENT)
Dept: ADMINISTRATIVE | Facility: HOSPITAL | Age: 38
End: 2023-02-01
Payer: COMMERCIAL